# Patient Record
Sex: FEMALE | Race: WHITE | NOT HISPANIC OR LATINO | Employment: OTHER | URBAN - NONMETROPOLITAN AREA
[De-identification: names, ages, dates, MRNs, and addresses within clinical notes are randomized per-mention and may not be internally consistent; named-entity substitution may affect disease eponyms.]

---

## 2024-03-12 ENCOUNTER — APPOINTMENT (EMERGENCY)
Dept: RADIOLOGY | Facility: HOSPITAL | Age: 84
DRG: 480 | End: 2024-03-12
Payer: COMMERCIAL

## 2024-03-12 ENCOUNTER — HOSPITAL ENCOUNTER (INPATIENT)
Facility: HOSPITAL | Age: 84
LOS: 2 days | Discharge: NURSING, REHAB OR LONG-TERM CARE | DRG: 480 | End: 2024-03-15
Attending: STUDENT IN AN ORGANIZED HEALTH CARE EDUCATION/TRAINING PROGRAM | Admitting: FAMILY MEDICINE
Payer: COMMERCIAL

## 2024-03-12 DIAGNOSIS — S72.002A CLOSED FRACTURE OF LEFT HIP, INITIAL ENCOUNTER (HCC): Primary | ICD-10-CM

## 2024-03-12 PROBLEM — D72.823 LEUKEMOID REACTION: Status: ACTIVE | Noted: 2024-03-12
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
## 2024-03-12 PROBLEM — S72.002D CLOSED FRACTURE OF LEFT HIP WITH ROUTINE HEALING: Status: ACTIVE | Noted: 2024-03-12

## 2024-03-12 LAB
ALBUMIN SERPL BCP-MCNC: 4 G/DL (ref 3.5–5)
ALP SERPL-CCNC: 71 U/L (ref 34–104)
ALT SERPL W P-5'-P-CCNC: 12 U/L (ref 7–52)
ANION GAP SERPL CALCULATED.3IONS-SCNC: 9 MMOL/L (ref 4–13)
APTT PPP: 23 SECONDS (ref 23–37)
AST SERPL W P-5'-P-CCNC: 17 U/L (ref 13–39)
ATRIAL RATE: 87 BPM
BASOPHILS # BLD AUTO: 0.04 THOUSANDS/ÂΜL (ref 0–0.1)
BASOPHILS NFR BLD AUTO: 0 % (ref 0–1)
BILIRUB DIRECT SERPL-MCNC: 0.14 MG/DL (ref 0–0.2)
BILIRUB SERPL-MCNC: 0.65 MG/DL (ref 0.2–1)
BILIRUB UR QL STRIP: ABNORMAL
BUN SERPL-MCNC: 17 MG/DL (ref 5–25)
CALCIUM SERPL-MCNC: 9.6 MG/DL (ref 8.4–10.2)
CHLORIDE SERPL-SCNC: 108 MMOL/L (ref 96–108)
CLARITY UR: CLEAR
CO2 SERPL-SCNC: 26 MMOL/L (ref 21–32)
COLOR UR: YELLOW
CREAT SERPL-MCNC: 0.73 MG/DL (ref 0.6–1.3)
EOSINOPHIL # BLD AUTO: 0.01 THOUSAND/ÂΜL (ref 0–0.61)
EOSINOPHIL NFR BLD AUTO: 0 % (ref 0–6)
ERYTHROCYTE [DISTWIDTH] IN BLOOD BY AUTOMATED COUNT: 12.9 % (ref 11.6–15.1)
GFR SERPL CREATININE-BSD FRML MDRD: 77 ML/MIN/1.73SQ M
GLUCOSE P FAST SERPL-MCNC: 136 MG/DL (ref 65–99)
GLUCOSE SERPL-MCNC: 136 MG/DL (ref 65–140)
GLUCOSE UR STRIP-MCNC: NEGATIVE MG/DL
HCT VFR BLD AUTO: 42.2 % (ref 34.8–46.1)
HGB BLD-MCNC: 13.1 G/DL (ref 11.5–15.4)
HGB UR QL STRIP.AUTO: ABNORMAL
IMM GRANULOCYTES # BLD AUTO: 0.11 THOUSAND/UL (ref 0–0.2)
IMM GRANULOCYTES NFR BLD AUTO: 1 % (ref 0–2)
INR PPP: 1.01 (ref 0.84–1.19)
KETONES UR STRIP-MCNC: ABNORMAL MG/DL
LEUKOCYTE ESTERASE UR QL STRIP: NEGATIVE
LYMPHOCYTES # BLD AUTO: 1.38 THOUSANDS/ÂΜL (ref 0.6–4.47)
LYMPHOCYTES NFR BLD AUTO: 9 % (ref 14–44)
MAGNESIUM SERPL-MCNC: 1.8 MG/DL (ref 1.9–2.7)
MCH RBC QN AUTO: 30.1 PG (ref 26.8–34.3)
MCHC RBC AUTO-ENTMCNC: 31 G/DL (ref 31.4–37.4)
MCV RBC AUTO: 97 FL (ref 82–98)
MONOCYTES # BLD AUTO: 0.74 THOUSAND/ÂΜL (ref 0.17–1.22)
MONOCYTES NFR BLD AUTO: 5 % (ref 4–12)
NEUTROPHILS # BLD AUTO: 12.38 THOUSANDS/ÂΜL (ref 1.85–7.62)
NEUTS SEG NFR BLD AUTO: 85 % (ref 43–75)
NITRITE UR QL STRIP: NEGATIVE
NRBC BLD AUTO-RTO: 0 /100 WBCS
P AXIS: 59 DEGREES
PH UR STRIP.AUTO: 7 [PH]
PLATELET # BLD AUTO: 302 THOUSANDS/UL (ref 149–390)
PMV BLD AUTO: 9.4 FL (ref 8.9–12.7)
POTASSIUM SERPL-SCNC: 4.1 MMOL/L (ref 3.5–5.3)
PR INTERVAL: 162 MS
PROT SERPL-MCNC: 6.7 G/DL (ref 6.4–8.4)
PROT UR STRIP-MCNC: NEGATIVE MG/DL
PROTHROMBIN TIME: 13.6 SECONDS (ref 11.6–14.5)
QRS AXIS: 23 DEGREES
QRSD INTERVAL: 82 MS
QT INTERVAL: 358 MS
QTC INTERVAL: 430 MS
RBC # BLD AUTO: 4.35 MILLION/UL (ref 3.81–5.12)
SODIUM SERPL-SCNC: 143 MMOL/L (ref 135–147)
SP GR UR STRIP.AUTO: 1.02 (ref 1–1.03)
T WAVE AXIS: 52 DEGREES
UROBILINOGEN UR QL STRIP.AUTO: 0.2 E.U./DL
VENTRICULAR RATE: 87 BPM
WBC # BLD AUTO: 14.66 THOUSAND/UL (ref 4.31–10.16)

## 2024-03-12 PROCEDURE — 99285 EMERGENCY DEPT VISIT HI MDM: CPT | Performed by: PHYSICIAN ASSISTANT

## 2024-03-12 PROCEDURE — 80076 HEPATIC FUNCTION PANEL: CPT | Performed by: FAMILY MEDICINE

## 2024-03-12 PROCEDURE — 93005 ELECTROCARDIOGRAM TRACING: CPT

## 2024-03-12 PROCEDURE — 81001 URINALYSIS AUTO W/SCOPE: CPT | Performed by: FAMILY MEDICINE

## 2024-03-12 PROCEDURE — 93010 ELECTROCARDIOGRAM REPORT: CPT | Performed by: INTERNAL MEDICINE

## 2024-03-12 PROCEDURE — 85025 COMPLETE CBC W/AUTO DIFF WBC: CPT | Performed by: PHYSICIAN ASSISTANT

## 2024-03-12 PROCEDURE — 85730 THROMBOPLASTIN TIME PARTIAL: CPT | Performed by: PHYSICIAN ASSISTANT

## 2024-03-12 PROCEDURE — 73564 X-RAY EXAM KNEE 4 OR MORE: CPT

## 2024-03-12 PROCEDURE — 99284 EMERGENCY DEPT VISIT MOD MDM: CPT

## 2024-03-12 PROCEDURE — 36415 COLL VENOUS BLD VENIPUNCTURE: CPT | Performed by: PHYSICIAN ASSISTANT

## 2024-03-12 PROCEDURE — 99222 1ST HOSP IP/OBS MODERATE 55: CPT | Performed by: FAMILY MEDICINE

## 2024-03-12 PROCEDURE — 73502 X-RAY EXAM HIP UNI 2-3 VIEWS: CPT

## 2024-03-12 PROCEDURE — 80048 BASIC METABOLIC PNL TOTAL CA: CPT | Performed by: PHYSICIAN ASSISTANT

## 2024-03-12 PROCEDURE — 83735 ASSAY OF MAGNESIUM: CPT | Performed by: FAMILY MEDICINE

## 2024-03-12 PROCEDURE — 85610 PROTHROMBIN TIME: CPT | Performed by: PHYSICIAN ASSISTANT

## 2024-03-12 RX ORDER — DOCUSATE SODIUM 100 MG/1
100 CAPSULE, LIQUID FILLED ORAL 2 TIMES DAILY
Status: DISCONTINUED | OUTPATIENT
Start: 2024-03-12 | End: 2024-03-15 | Stop reason: HOSPADM

## 2024-03-12 RX ORDER — CHLORHEXIDINE GLUCONATE ORAL RINSE 1.2 MG/ML
15 SOLUTION DENTAL ONCE
Status: COMPLETED | OUTPATIENT
Start: 2024-03-12 | End: 2024-03-12

## 2024-03-12 RX ORDER — ONDANSETRON 2 MG/ML
4 INJECTION INTRAMUSCULAR; INTRAVENOUS EVERY 6 HOURS PRN
Status: DISCONTINUED | OUTPATIENT
Start: 2024-03-12 | End: 2024-03-13

## 2024-03-12 RX ORDER — ACETAMINOPHEN 325 MG/1
650 TABLET ORAL ONCE
Status: COMPLETED | OUTPATIENT
Start: 2024-03-12 | End: 2024-03-12

## 2024-03-12 RX ORDER — ACETAMINOPHEN 160 MG/5ML
650 SUSPENSION ORAL EVERY 4 HOURS PRN
Status: DISCONTINUED | OUTPATIENT
Start: 2024-03-12 | End: 2024-03-13

## 2024-03-12 RX ORDER — ENOXAPARIN SODIUM 100 MG/ML
40 INJECTION SUBCUTANEOUS DAILY
Status: DISCONTINUED | OUTPATIENT
Start: 2024-03-13 | End: 2024-03-13

## 2024-03-12 RX ORDER — MAGNESIUM SULFATE HEPTAHYDRATE 40 MG/ML
2 INJECTION, SOLUTION INTRAVENOUS ONCE
Status: COMPLETED | OUTPATIENT
Start: 2024-03-12 | End: 2024-03-12

## 2024-03-12 RX ORDER — SODIUM CHLORIDE, SODIUM LACTATE, POTASSIUM CHLORIDE, CALCIUM CHLORIDE 600; 310; 30; 20 MG/100ML; MG/100ML; MG/100ML; MG/100ML
75 INJECTION, SOLUTION INTRAVENOUS CONTINUOUS
Status: DISCONTINUED | OUTPATIENT
Start: 2024-03-12 | End: 2024-03-13

## 2024-03-12 RX ORDER — TRANEXAMIC ACID 10 MG/ML
1000 INJECTION, SOLUTION INTRAVENOUS ONCE
Status: COMPLETED | OUTPATIENT
Start: 2024-03-12 | End: 2024-03-12

## 2024-03-12 RX ADMIN — CHLORHEXIDINE GLUCONATE 15 ML: 1.2 RINSE ORAL at 20:52

## 2024-03-12 RX ADMIN — TRANEXAMIC ACID 1000 MG: 10 INJECTION, SOLUTION INTRAVENOUS at 19:45

## 2024-03-12 RX ADMIN — DOCUSATE SODIUM 100 MG: 100 CAPSULE, LIQUID FILLED ORAL at 20:53

## 2024-03-12 RX ADMIN — SODIUM CHLORIDE, SODIUM LACTATE, POTASSIUM CHLORIDE, AND CALCIUM CHLORIDE 75 ML/HR: .6; .31; .03; .02 INJECTION, SOLUTION INTRAVENOUS at 20:41

## 2024-03-12 RX ADMIN — ACETAMINOPHEN 325MG 650 MG: 325 TABLET ORAL at 17:35

## 2024-03-12 RX ADMIN — MAGNESIUM SULFATE HEPTAHYDRATE 2 G: 40 INJECTION, SOLUTION INTRAVENOUS at 20:58

## 2024-03-12 NOTE — DISCHARGE INSTRUCTIONS
Weight Bearing Status:                                           Weight bearing as tolerated left lower extremity.     DVT prophylaxis:  Lovenox daily x 3 weeks     Pain:  Continue analgesics as directed    Dressing Instructions:   You may remove your surgical dressing in 3 days (72 hours).   After this has been removed, you may leave incision open to air or cover it with a light gauze dressing.     Showering:   After your surgical dressing has been removed, you may shower and allow water to run over your incision.   Do not submerge incision for anything length of time. (No tubs, pools, hot tubs, etc)    Appt Instructions:   If you do not have your appointment, please call the clinic at [phone] to f/u with Dr. Huber within 2 weeks of discharge.     Contact the office sooner if you experience any increased numbness/tingling in the extremities or severe pain that does not respond to pain medication.       
room air
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
## 2024-03-13 ENCOUNTER — ANESTHESIA (INPATIENT)
Dept: PERIOP | Facility: HOSPITAL | Age: 84
DRG: 480 | End: 2024-03-13
Payer: COMMERCIAL

## 2024-03-13 ENCOUNTER — APPOINTMENT (INPATIENT)
Dept: RADIOLOGY | Facility: HOSPITAL | Age: 84
DRG: 480 | End: 2024-03-13
Payer: COMMERCIAL

## 2024-03-13 ENCOUNTER — ANESTHESIA EVENT (INPATIENT)
Dept: PERIOP | Facility: HOSPITAL | Age: 84
DRG: 480 | End: 2024-03-13
Payer: COMMERCIAL

## 2024-03-13 PROBLEM — E66.9 OBESITY, CLASS I, BMI 30-34.9: Status: ACTIVE | Noted: 2024-03-13

## 2024-03-13 PROBLEM — D72.823 LEUKEMOID REACTION: Status: RESOLVED | Noted: 2024-03-12 | Resolved: 2024-03-13

## 2024-03-13 LAB
ANION GAP SERPL CALCULATED.3IONS-SCNC: 7 MMOL/L (ref 4–13)
BACTERIA UR QL AUTO: NORMAL /HPF
BASOPHILS # BLD AUTO: 0.04 THOUSANDS/ÂΜL (ref 0–0.1)
BASOPHILS NFR BLD AUTO: 0 % (ref 0–1)
BUN SERPL-MCNC: 14 MG/DL (ref 5–25)
CALCIUM SERPL-MCNC: 9.2 MG/DL (ref 8.4–10.2)
CHLORIDE SERPL-SCNC: 108 MMOL/L (ref 96–108)
CO2 SERPL-SCNC: 26 MMOL/L (ref 21–32)
CREAT SERPL-MCNC: 0.6 MG/DL (ref 0.6–1.3)
EOSINOPHIL # BLD AUTO: 0.01 THOUSAND/ÂΜL (ref 0–0.61)
EOSINOPHIL NFR BLD AUTO: 0 % (ref 0–6)
ERYTHROCYTE [DISTWIDTH] IN BLOOD BY AUTOMATED COUNT: 12.9 % (ref 11.6–15.1)
GFR SERPL CREATININE-BSD FRML MDRD: 85 ML/MIN/1.73SQ M
GLUCOSE P FAST SERPL-MCNC: 119 MG/DL (ref 65–99)
GLUCOSE SERPL-MCNC: 119 MG/DL (ref 65–140)
HCT VFR BLD AUTO: 36 % (ref 34.8–46.1)
HGB BLD-MCNC: 11.4 G/DL (ref 11.5–15.4)
IMM GRANULOCYTES # BLD AUTO: 0.05 THOUSAND/UL (ref 0–0.2)
IMM GRANULOCYTES NFR BLD AUTO: 1 % (ref 0–2)
LYMPHOCYTES # BLD AUTO: 1.52 THOUSANDS/ÂΜL (ref 0.6–4.47)
LYMPHOCYTES NFR BLD AUTO: 15 % (ref 14–44)
MAGNESIUM SERPL-MCNC: 2.2 MG/DL (ref 1.9–2.7)
MCH RBC QN AUTO: 30 PG (ref 26.8–34.3)
MCHC RBC AUTO-ENTMCNC: 31.7 G/DL (ref 31.4–37.4)
MCV RBC AUTO: 95 FL (ref 82–98)
MONOCYTES # BLD AUTO: 0.8 THOUSAND/ÂΜL (ref 0.17–1.22)
MONOCYTES NFR BLD AUTO: 8 % (ref 4–12)
NEUTROPHILS # BLD AUTO: 7.62 THOUSANDS/ÂΜL (ref 1.85–7.62)
NEUTS SEG NFR BLD AUTO: 76 % (ref 43–75)
NON-SQ EPI CELLS URNS QL MICRO: NORMAL /HPF
NRBC BLD AUTO-RTO: 0 /100 WBCS
PLATELET # BLD AUTO: 269 THOUSANDS/UL (ref 149–390)
PMV BLD AUTO: 9.4 FL (ref 8.9–12.7)
POTASSIUM SERPL-SCNC: 4.2 MMOL/L (ref 3.5–5.3)
RBC # BLD AUTO: 3.8 MILLION/UL (ref 3.81–5.12)
RBC #/AREA URNS AUTO: NORMAL /HPF
SODIUM SERPL-SCNC: 141 MMOL/L (ref 135–147)
WBC # BLD AUTO: 10.04 THOUSAND/UL (ref 4.31–10.16)
WBC #/AREA URNS AUTO: NORMAL /HPF

## 2024-03-13 PROCEDURE — 99223 1ST HOSP IP/OBS HIGH 75: CPT | Performed by: ORTHOPAEDIC SURGERY

## 2024-03-13 PROCEDURE — 83735 ASSAY OF MAGNESIUM: CPT | Performed by: FAMILY MEDICINE

## 2024-03-13 PROCEDURE — 85025 COMPLETE CBC W/AUTO DIFF WBC: CPT | Performed by: FAMILY MEDICINE

## 2024-03-13 PROCEDURE — C1769 GUIDE WIRE: HCPCS | Performed by: ORTHOPAEDIC SURGERY

## 2024-03-13 PROCEDURE — 27245 TREAT THIGH FRACTURE: CPT | Performed by: ORTHOPAEDIC SURGERY

## 2024-03-13 PROCEDURE — 27245 TREAT THIGH FRACTURE: CPT | Performed by: PHYSICIAN ASSISTANT

## 2024-03-13 PROCEDURE — C1713 ANCHOR/SCREW BN/BN,TIS/BN: HCPCS | Performed by: ORTHOPAEDIC SURGERY

## 2024-03-13 PROCEDURE — NC001 PR NO CHARGE: Performed by: ORTHOPAEDIC SURGERY

## 2024-03-13 PROCEDURE — 73502 X-RAY EXAM HIP UNI 2-3 VIEWS: CPT

## 2024-03-13 PROCEDURE — 99232 SBSQ HOSP IP/OBS MODERATE 35: CPT

## 2024-03-13 PROCEDURE — 0QS706Z REPOSITION LEFT UPPER FEMUR WITH INTRAMEDULLARY INTERNAL FIXATION DEVICE, OPEN APPROACH: ICD-10-PCS | Performed by: ORTHOPAEDIC SURGERY

## 2024-03-13 PROCEDURE — 80048 BASIC METABOLIC PNL TOTAL CA: CPT | Performed by: FAMILY MEDICINE

## 2024-03-13 DEVICE — 5.0MM TI LOCKING SCREW W/T25 STARDRIVE 38MM F/IM NAIL-STER: Type: IMPLANTABLE DEVICE | Status: FUNCTIONAL

## 2024-03-13 DEVICE — TFNA FENESTRATED SCREW 100MM - STERILE
Type: IMPLANTABLE DEVICE | Status: FUNCTIONAL
Brand: TFN-ADVANCE

## 2024-03-13 DEVICE — 12MM/130 DEG TI CANN TFNA 170MM - STERILE
Type: IMPLANTABLE DEVICE | Status: FUNCTIONAL
Brand: TFN-ADVANCE

## 2024-03-13 RX ORDER — PROPOFOL 10 MG/ML
INJECTION, EMULSION INTRAVENOUS AS NEEDED
Status: DISCONTINUED | OUTPATIENT
Start: 2024-03-13 | End: 2024-03-13

## 2024-03-13 RX ORDER — FENTANYL CITRATE/PF 50 MCG/ML
25 SYRINGE (ML) INJECTION
Status: DISCONTINUED | OUTPATIENT
Start: 2024-03-13 | End: 2024-03-13

## 2024-03-13 RX ORDER — OXYCODONE HYDROCHLORIDE 5 MG/1
2.5 TABLET ORAL EVERY 6 HOURS PRN
Status: DISCONTINUED | OUTPATIENT
Start: 2024-03-13 | End: 2024-03-13

## 2024-03-13 RX ORDER — MAGNESIUM HYDROXIDE 1200 MG/15ML
LIQUID ORAL AS NEEDED
Status: DISCONTINUED | OUTPATIENT
Start: 2024-03-13 | End: 2024-03-13 | Stop reason: HOSPADM

## 2024-03-13 RX ORDER — LIDOCAINE HYDROCHLORIDE 10 MG/ML
INJECTION, SOLUTION EPIDURAL; INFILTRATION; INTRACAUDAL; PERINEURAL AS NEEDED
Status: DISCONTINUED | OUTPATIENT
Start: 2024-03-13 | End: 2024-03-13

## 2024-03-13 RX ORDER — DEXAMETHASONE SODIUM PHOSPHATE 10 MG/ML
INJECTION, SOLUTION INTRAMUSCULAR; INTRAVENOUS AS NEEDED
Status: DISCONTINUED | OUTPATIENT
Start: 2024-03-13 | End: 2024-03-13

## 2024-03-13 RX ORDER — ONDANSETRON 2 MG/ML
4 INJECTION INTRAMUSCULAR; INTRAVENOUS ONCE AS NEEDED
Status: DISCONTINUED | OUTPATIENT
Start: 2024-03-13 | End: 2024-03-13

## 2024-03-13 RX ORDER — FENTANYL CITRATE 50 UG/ML
INJECTION, SOLUTION INTRAMUSCULAR; INTRAVENOUS AS NEEDED
Status: DISCONTINUED | OUTPATIENT
Start: 2024-03-13 | End: 2024-03-13

## 2024-03-13 RX ORDER — BUPIVACAINE HYDROCHLORIDE 5 MG/ML
INJECTION, SOLUTION EPIDURAL; INTRACAUDAL AS NEEDED
Status: DISCONTINUED | OUTPATIENT
Start: 2024-03-13 | End: 2024-03-13 | Stop reason: HOSPADM

## 2024-03-13 RX ORDER — HYDROMORPHONE HCL IN WATER/PF 6 MG/30 ML
0.2 PATIENT CONTROLLED ANALGESIA SYRINGE INTRAVENOUS
Status: DISCONTINUED | OUTPATIENT
Start: 2024-03-13 | End: 2024-03-13

## 2024-03-13 RX ORDER — ROCURONIUM BROMIDE 10 MG/ML
INJECTION, SOLUTION INTRAVENOUS AS NEEDED
Status: DISCONTINUED | OUTPATIENT
Start: 2024-03-13 | End: 2024-03-13

## 2024-03-13 RX ORDER — ONDANSETRON 2 MG/ML
INJECTION INTRAMUSCULAR; INTRAVENOUS AS NEEDED
Status: DISCONTINUED | OUTPATIENT
Start: 2024-03-13 | End: 2024-03-13

## 2024-03-13 RX ORDER — ONDANSETRON 2 MG/ML
4 INJECTION INTRAMUSCULAR; INTRAVENOUS EVERY 6 HOURS PRN
Status: DISCONTINUED | OUTPATIENT
Start: 2024-03-13 | End: 2024-03-15 | Stop reason: HOSPADM

## 2024-03-13 RX ORDER — SODIUM CHLORIDE, SODIUM LACTATE, POTASSIUM CHLORIDE, CALCIUM CHLORIDE 600; 310; 30; 20 MG/100ML; MG/100ML; MG/100ML; MG/100ML
75 INJECTION, SOLUTION INTRAVENOUS CONTINUOUS
Status: DISCONTINUED | OUTPATIENT
Start: 2024-03-13 | End: 2024-03-14

## 2024-03-13 RX ORDER — OXYCODONE HYDROCHLORIDE 10 MG/1
10 TABLET ORAL EVERY 6 HOURS PRN
Status: DISCONTINUED | OUTPATIENT
Start: 2024-03-13 | End: 2024-03-15 | Stop reason: HOSPADM

## 2024-03-13 RX ORDER — ENOXAPARIN SODIUM 100 MG/ML
40 INJECTION SUBCUTANEOUS DAILY
Status: DISCONTINUED | OUTPATIENT
Start: 2024-03-14 | End: 2024-03-15 | Stop reason: HOSPADM

## 2024-03-13 RX ORDER — OXYCODONE HYDROCHLORIDE 5 MG/1
5 TABLET ORAL EVERY 6 HOURS PRN
Status: DISCONTINUED | OUTPATIENT
Start: 2024-03-13 | End: 2024-03-15 | Stop reason: HOSPADM

## 2024-03-13 RX ORDER — CEFAZOLIN SODIUM 2 G/50ML
2000 SOLUTION INTRAVENOUS ONCE
Status: COMPLETED | OUTPATIENT
Start: 2024-03-13 | End: 2024-03-13

## 2024-03-13 RX ORDER — TRANEXAMIC ACID 10 MG/ML
1000 INJECTION, SOLUTION INTRAVENOUS ONCE
Status: COMPLETED | OUTPATIENT
Start: 2024-03-13 | End: 2024-03-13

## 2024-03-13 RX ORDER — OXYCODONE HYDROCHLORIDE 5 MG/1
5 TABLET ORAL EVERY 6 HOURS PRN
Status: DISCONTINUED | OUTPATIENT
Start: 2024-03-13 | End: 2024-03-13

## 2024-03-13 RX ORDER — CEFAZOLIN SODIUM 2 G/50ML
2000 SOLUTION INTRAVENOUS EVERY 8 HOURS
Status: COMPLETED | OUTPATIENT
Start: 2024-03-14 | End: 2024-03-14

## 2024-03-13 RX ADMIN — SODIUM CHLORIDE, SODIUM LACTATE, POTASSIUM CHLORIDE, AND CALCIUM CHLORIDE 75 ML/HR: .6; .31; .03; .02 INJECTION, SOLUTION INTRAVENOUS at 20:59

## 2024-03-13 RX ADMIN — ROCURONIUM BROMIDE 50 MG: 10 INJECTION, SOLUTION INTRAVENOUS at 18:34

## 2024-03-13 RX ADMIN — FENTANYL CITRATE 100 MCG: 50 INJECTION INTRAMUSCULAR; INTRAVENOUS at 18:34

## 2024-03-13 RX ADMIN — ROCURONIUM BROMIDE 10 MG: 10 INJECTION, SOLUTION INTRAVENOUS at 19:26

## 2024-03-13 RX ADMIN — LIDOCAINE HYDROCHLORIDE 50 MG: 10 INJECTION, SOLUTION EPIDURAL; INFILTRATION; INTRACAUDAL; PERINEURAL at 18:34

## 2024-03-13 RX ADMIN — ACETAMINOPHEN 650 MG: 650 SUSPENSION ORAL at 04:57

## 2024-03-13 RX ADMIN — DEXAMETHASONE SODIUM PHOSPHATE 10 MG: 10 INJECTION, SOLUTION INTRAMUSCULAR; INTRAVENOUS at 18:35

## 2024-03-13 RX ADMIN — TRANEXAMIC ACID 1000 MG: 10 INJECTION, SOLUTION INTRAVENOUS at 18:40

## 2024-03-13 RX ADMIN — CEFAZOLIN SODIUM 2000 MG: 2 SOLUTION INTRAVENOUS at 18:54

## 2024-03-13 RX ADMIN — ENOXAPARIN SODIUM 40 MG: 40 INJECTION SUBCUTANEOUS at 08:21

## 2024-03-13 RX ADMIN — ONDANSETRON 4 MG: 2 INJECTION INTRAMUSCULAR; INTRAVENOUS at 19:49

## 2024-03-13 RX ADMIN — SODIUM CHLORIDE, SODIUM LACTATE, POTASSIUM CHLORIDE, AND CALCIUM CHLORIDE 75 ML/HR: .6; .31; .03; .02 INJECTION, SOLUTION INTRAVENOUS at 08:25

## 2024-03-13 RX ADMIN — DOCUSATE SODIUM 100 MG: 100 CAPSULE, LIQUID FILLED ORAL at 08:21

## 2024-03-13 RX ADMIN — SUGAMMADEX 200 MG: 100 INJECTION, SOLUTION INTRAVENOUS at 20:05

## 2024-03-13 RX ADMIN — PROPOFOL 100 MG: 10 INJECTION, EMULSION INTRAVENOUS at 18:34

## 2024-03-13 NOTE — ASSESSMENT & PLAN NOTE
- As above  - No history of syncope or seizure  - Per history, appears purely mechanical  - Check orthostatics  - Check EKG  - Check UA  - Will require PT/OT evaluation-possible placement versus home health care

## 2024-03-13 NOTE — OCCUPATIONAL THERAPY NOTE
Occupational Therapy Cancel Note        Patient Name: Madalyn Dhaliwal  Today's Date: 3/13/2024       03/13/24 1521   Note Type   Note type Evaluation;Cancelled Session   Cancel Reasons Medical status       OT order received, chart review completed. Pt admitted to [facility] on 3/12 w/ Dx: Closed fracture of L hip with routine healing. Pt to receive surgery this evening. Will continue to follow pt and provide care as pt is appropriate and available/pending ortho recommendations postop.      Chet Arriaga MS, OTR/L

## 2024-03-13 NOTE — ASSESSMENT & PLAN NOTE
- Patient presents with isolated leukocytosis with neutrophilic infiltration  - In absence of concurrent SIRS criteria  - Doubt underlying infection, however, rule out  - Plain film chest x-ray unrevealing  - Check urinalysis
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
## 2024-03-13 NOTE — PHYSICAL THERAPY NOTE
PHYSICAL THERAPY NOTE      Patient Name: Madalyn Dhaliwal  Today's Date: 3/13/2024       03/13/24 1520   Note Type   Note type Cancelled Session;Evaluation   Cancel Reasons Medical status       Received order for PT consult. Chart reviewed. Pt admitted with diagnosis of intertrochanteric fracture of proximal L femur. At this time, PT session cancelled due to plan for surgical fixation this evening. Will follow and see patient as medically appropriate at a later time and await final orthopedic recommendations.       Viet Singh, PT

## 2024-03-13 NOTE — ANESTHESIA PREPROCEDURE EVALUATION
"Procedure:  INSERTION NAIL IM FEMUR ANTEGRADE (TROCHANTERIC) (Left: Leg Upper)    Relevant Problems   Orthopedic/Musculoskeletal   (+) Closed fracture of left hip with routine healing      Other   (+) Fall from standing   (+) Obesity, Class I, BMI 30-34.9        Physical Exam    Airway    Mallampati score: II  TM Distance: >3 FB  Neck ROM: full     Dental        Cardiovascular      Pulmonary      Other Findings  Mild deliriumpost-pubertal.    Anesthesia Plan  ASA Score- 2     Anesthesia Type- general with ASA Monitors.         Additional Monitors:     Airway Plan: ETT.           Plan Factors-    Chart reviewed. EKG reviewed. Imaging results reviewed. Existing labs reviewed. Patient summary reviewed.    Patient is not a current smoker.  Patient did not smoke on day of surgery.            Induction- intravenous.    Postoperative Plan- Plan for postoperative opioid use. Planned trial extubation    Informed Consent- Anesthetic plan and risks discussed with patient.  I personally reviewed this patient with the CRNA. Discussed and agreed on the Anesthesia Plan with the CRNA..          VITALS  /63 (BP Location: Left arm)   Pulse 73   Temp 98.3 °F (36.8 °C) (Oral)   Resp 18   Ht 5' 5\" (1.651 m)   Wt 84.3 kg (185 lb 13.6 oz)   SpO2 94%   BMI 30.93 kg/m²   BP Readings from Last 3 Encounters:   03/13/24 147/63     LABS  Results from Last 12 Months   Lab Units 03/13/24 0454 03/1942   SODIUM mmol/L 141 143   POTASSIUM mmol/L 4.2 4.1   CHLORIDE mmol/L 108 108   CO2 mmol/L 26 26   ANION GAP mmol/L 7 9   BUN mg/dL 14 17   CREATININE mg/dL 0.60 0.73   CALCIUM mg/dL 9.2 9.6   GLUCOSE RANDOM mg/dL 119 136   MAGNESIUM mg/dL 2.2 1.8*   AST U/L  --  17   ALT U/L  --  12   ALK PHOS U/L  --  71   TOTAL BILIRUBIN mg/dL  --  0.65   ALBUMIN g/dL  --  4.0     Results from Last 12 Months   Lab Units 03/13/24 0454 03/12/24  1941   WBC Thousand/uL 10.04 14.66*   HEMOGLOBIN g/dL 11.4* 13.1   HEMATOCRIT % 36.0 42.2   PLATELETS " Thousands/uL 269 302     Results from Last 12 Months   Lab Units 03/12/24  1941   INR  1.01   PTT seconds 23       ECG  Encounter Date: 03/12/24   ECG 12 lead   Result Value    Ventricular Rate 87    Atrial Rate 87    VA Interval 162    QRSD Interval 82    QT Interval 358    QTC Interval 430    P Axis 59    QRS Axis 23    T Wave Axis 52    Narrative    Normal sinus rhythm  Cannot rule out Inferior infarct , age undetermined  Anterior infarct , age undetermined  Abnormal ECG  No previous ECGs available  Confirmed by Kasia Ellington ([id number]) on 3/12/2024 10:16:39 PM     No results found for this or any previous visit.    ECHOCARDIOGRAPHY AND OTHER TESTING/IMAGING  TTE POCUS: normal    ANESTHESIA RISK-BENEFIT DISCUSSION  BENEFITS INCLUDE (NBK [id number], PMID [id number]):   (1) A specialized anesthesia team reduces mortality and morbidity for major surgeries.  (2) The team provides analgesia/sedation/amnesia/akinesia as safely as possible.  (3) The team strives to reduce discomfort as much as reasonably possible.    RISKS ASSESSMENT (AND PLANS TO MITIGATE RISKS) INCLUDE:      Neurologic system: IntraOp awareness (Risk is ~1:1,000 - 1:14,000; PMID 86306932), Stroke (Risk ~<0.1-2% for most cases; PMID 98629715), nerve injury, vision loss, and POCD.  Since regional anesthesia may be used, risks of block failure, bleeding, infection, and nerve injury were discussed.  Airway and Pulmonary system: Dental or mouth injury, throat pain, critical hypoxia, pneumothorax, prolonged intubation, post-op respiratory compromise.  Airway/Intubation risks and prior data: No prior advanced airway notes in [facility] EMR  Major ARISCAT risk factors for pulmonary complications include: Age >80: 1 pt and Case surgical time estimated at >2hrs: 1pt, yielding a score 2-3= Intermediate risk, 13.3%.  Cardiovascular system: Hypotension/Vasoplegia, arrhythmias, blood clot, allison-op cardiac injury/MACE, bleeding, infection, or injury to vascular  structures.  Signs of active, severe cardiac instability: none  Milton's RCRI score items: none, yielding an RCRI Score of 0= 0.4% risk of MACE  Are allison-op or intra-op beta blockers indicated? (PMID [id number]): no  FEN/GI system: Aspiration risk (~0.5% [facility] [id number]) and PONV (10-80% per Apfel score).  ASA NPO guideline compliance?: Yes  Medication risk assessment: Allergic reactions, bleeding due to anticoagulant use, overdoses, drug-drug interactions, injury to a fetus or  in pregnant or breastfeeding patients, sedation while driving/operating heavy machinery.  Recent relevant medications: See MAR or Med Review  Personal or family history of anesthesia complications: no  Pregnancy Status: N/A  Estimate mortality risks associated with anesthesia based on ASA-PS (PMID [id number]): ASA-PS II: risk 1:20,000

## 2024-03-13 NOTE — H&P
[facility]  H&P  Name: Madalyn Dhaliwal 83 y.o. female I MRN: [id number]  Unit/Bed#: [facility] 308-01 I Date of Admission: 3/12/2024   Date of Service: 3/12/2024 I Hospital Day: 0      Assessment/Plan   Leukemoid reaction  Assessment & Plan  - Patient presents with isolated leukocytosis with neutrophilic infiltration  - In absence of concurrent SIRS criteria  - Doubt underlying infection, however, rule out  - Plain film chest x-ray unrevealing  - Check urinalysis    Fall from standing  Assessment & Plan  - As above  - No history of syncope or seizure  - Per history, appears purely mechanical  - Check orthostatics  - Check EKG  - Check UA  - Will require PT/OT evaluation-possible placement versus home health care    Closed fracture of left hip with routine healing  Assessment & Plan  - Patient presents this evening after sustaining mechanical fall from standing  - Patient ambulating when hit with high morris of wind making her lose her balance  - Patient fell upon left hip, denies striking head  - Patient with pain and inability to ambulate after the event  - In ED, underwent routine plain film imaging which revealed left femoral neck fracture, nondisplaced, noncomminuted  - Case discussed between ED provider and orthopedic surgery who recommended admission for further evaluation and management  - Patient without complaints of loss of consciousness, lightheadedness, dizziness, shaking behavior, urinary or bladder incontinence  - Index of suspicion for purely mechanical fall from standing with subsequent left femoral neck fracture    Plan:  - Admit to medicine  - Orthopedic surgery consultation (Case already discussed between ED provider and orthopedic team)  - N.p.o. after midnight  - Analgesics/anti-inflammatories overnight  - Judicious maintenance fluids for time being as n.p.o. (no self-reported past medical history of congestive heart failure)  - Routine metabolic profile without significant  "derangement  - Check preoperative EKG  - Patient without self-reported past medical history  - Check UA    Based upon patient history and available objective data:  - Richter perioperative risk stratification:  - 30-day cardiac and/or stroke risk-0.24%  - 30-day cardiac risk-0.15%  - 30-day stroke risk-0.08%  - 30-day mortality risk 0.45%    Geriatric sensitive perioperative cardiac risk index:  - Probability of perioperative myocardial infarction or cardiac arrest-0.2%       VTE Prophylaxis: Enoxaparin (Lovenox)  / sequential compression device   Code Status: Full  POLST: There is no POLST form on file for this patient (pre-hospital)    Anticipated Length of Stay:  Patient will be admitted on an Observation basis with an anticipated length of stay of  < 2 midnights.   Justification for Hospital Stay: Hip fracture     Total Time for Visit, including Counseling / Coordination of Care: 45 minutes.  Greater than 50% of this total time spent on direct patient counseling and coordination of care.    Chief Complaint:   Fall/L hip pain    History of Present Illness:    Madalyn Dhaliwal is a 83 y.o. female with no self-reported past medical history who presents this evening after sustaining a fall.    The patient apparently was ambulating out of a store when she experienced a strong morris of wind.  Patient states that the wind \"knocked her over\" onto her left side, striking her hip upon the ground.  Patient denies head strike or other trauma.  Patient reports unable to ambulate/stand after fall.      Patient denies lightheadedness, dizziness, loss of consciousness, bladder or bowel incontinence, shaking behavior.  Denies history of seizure or syncope.    In ED, underwent plain film imaging of the hip which was suggestive of left femoral neck fracture.  Case discussed between ED provider and orthopedic surgery with recommendation for admission.    Patient will be admitted to the hospitalist service for further evaluation " "management of left hip fracture.  Review of Systems:    Review of Systems   Constitutional: Negative.    HENT: Negative.     Eyes: Negative.    Respiratory: Negative.     Cardiovascular: Negative.    Gastrointestinal: Negative.    Endocrine: Negative.    Genitourinary: Negative.    Musculoskeletal:  Positive for myalgias.   Skin: Negative.    Allergic/Immunologic: Negative.    Neurological: Negative.    Hematological: Negative.    Psychiatric/Behavioral: Negative.         Past Medical and Surgical History:     History reviewed. No pertinent past medical history.    Past Surgical History:   Procedure Laterality Date    MRI BREAST BIOPSY LEFT (ALL INCLUSIVE) Left 4/16/2014       Meds/Allergies:    Prior to Admission medications    Not on File     I have reviewed home medications with patient personally.    Allergies: No Known Allergies    Social History:     Marital Status: /Civil Union      Social History     Substance and Sexual Activity   Alcohol Use Never     Social History     Tobacco Use   Smoking Status Never   Smokeless Tobacco Never     Social History     Substance and Sexual Activity   Drug Use Never       Family History:    non-contributory    Physical Exam:     Vitals:   Blood Pressure: 145/81 (03/12/24 2037)  Pulse: 89 (03/12/24 2037)  Temperature: 98.8 °F (37.1 °C) (03/12/24 2037)  Temp Source: Temporal (03/12/24 2037)  Respirations: 20 (03/12/24 2037)  Height: 5' 5\" (165.1 cm) (03/12/24 2044)  Weight - Scale: 84.1 kg (185 lb 6.5 oz) (03/12/24 2044)  SpO2: 94 % (03/12/24 2037)    Physical Exam  Constitutional:       General: She is not in acute distress.     Appearance: Normal appearance. She is not ill-appearing, toxic-appearing or diaphoretic.   HENT:      Head: Normocephalic and atraumatic.      Right Ear: External ear normal.      Left Ear: External ear normal.      Nose: Nose normal.      Mouth/Throat:      Pharynx: Oropharynx is clear.   Eyes:      Conjunctiva/sclera: Conjunctivae normal. "   Cardiovascular:      Rate and Rhythm: Normal rate and regular rhythm.      Pulses: Normal pulses.      Heart sounds: No murmur heard.  Pulmonary:      Effort: Pulmonary effort is normal. No respiratory distress.      Breath sounds: No wheezing or rhonchi.   Abdominal:      General: Abdomen is flat. Bowel sounds are normal.      Tenderness: There is no abdominal tenderness.   Musculoskeletal:         General: Swelling, tenderness, deformity and signs of injury present.      Cervical back: Normal range of motion.   Skin:     General: Skin is warm and dry.      Capillary Refill: Capillary refill takes less than 2 seconds.   Neurological:      General: No focal deficit present.      Mental Status: She is alert. Mental status is at baseline.   Psychiatric:         Mood and Affect: Mood normal.         Thought Content: Thought content normal.         Judgment: Judgment normal.         Additional Data:     Lab Results: I have personally reviewed pertinent reports.      Results from last 7 days   Lab Units 03/12/24  1941   WBC Thousand/uL 14.66*   HEMOGLOBIN g/dL 13.1   HEMATOCRIT % 42.2   PLATELETS Thousands/uL 302   NEUTROS PCT % 85*   LYMPHS PCT % 9*   MONOS PCT % 5   EOS PCT % 0     Results from last 7 days   Lab Units 03/12/24  1942   POTASSIUM mmol/L 4.1   CHLORIDE mmol/L 108   CO2 mmol/L 26   BUN mg/dL 17   CREATININE mg/dL 0.73   CALCIUM mg/dL 9.6   ALK PHOS U/L 71   ALT U/L 12   AST U/L 17     Results from last 7 days   Lab Units 03/12/24  1941   INR  1.01       Imaging: I have personally reviewed pertinent reports.      No results found.    EKG, Pathology, and Other Studies Reviewed on Admission:   EKG:     [facility] / Care Everywhere Records Reviewed: Yes     ** Please Note: This note has been constructed using a voice recognition system. **

## 2024-03-13 NOTE — UTILIZATION REVIEW
"Initial Clinical Review    WAS OBSERVATION 3/12/24 @ 1929 CONVERTED TO INPATIENT ADMISSION 3/13/24 @ 0929 DUE TO CONTINUED STAY REQUIRED TO CARE FOR PATIENT WITH DX: CLOSED FRACTURE OF LEFT HIP.      Admission: Date/Time/Statement:   Admission Orders (From admission, onward)       Ordered        03/13/24 0929  Inpatient Admission  Once            03/12/24 1929  Place in Observation  Once                          Orders Placed This Encounter   Procedures    Inpatient Admission     Standing Status:   Standing     Number of Occurrences:   1     Order Specific Question:   Level of Care     Answer:   Med Surg [16]     Order Specific Question:   Estimated length of stay     Answer:   More than 2 Midnights     Order Specific Question:   Certification     Answer:   I certify that inpatient services are medically necessary for this patient for a duration of greater than two midnights. See H&P and MD Progress Notes for additional information about the patient's course of treatment.     ED Arrival Information       Expected   -    Arrival   3/12/2024 16:54    Acuity   Less Urgent              Means of arrival   Ambulance    Escorted by   [facility]    Service   Hospitalist    Admission type   Emergency              Arrival complaint   fall/knee pain             Chief Complaint   Patient presents with    Fall     Patient reports walking out of grocery store down ramp when a strong wind knocked her down. Reporting left knee pain. Denies head strike, no LOC.        Initial Presentation: 83 y.o. female to ED via EMS from home   Present to ED after sustaining a fall. The patient apparently was ambulating out of a store when she experienced a strong morris of wind. Patient states that the wind \"knocked her over\" onto her left side, striking her hip upon the ground. Patient denies head strike or other trauma. Patient reports unable to ambulate/stand after fall.   PMHX: None  Admitted to OBS with DX: Closed fracture of left hip with " routine healing   on exam: Swelling, tenderness, deformity  of left hip. leukocytosis   XR = left femoral neck fracture.   PLAN: cont ivf; pain / nausea control; monitor labs; consult ortho; f/u UA; f/u orthostatics; PT/ OT eval - tx; NPO after MN      Date: 3/13/24 - CHANGED TO INPATIENT  Plan for surgical fixation 3/13. Will need DVT prophylaxis for 3 weeks post op ; EKG and UA normal;  Patient states that pain is well controlled and comes and goes.   Plan: cont ivf; pain / nausea control; monitor labs; f/u PT/ OT post-op; NPO     ORTHO CONSULT  Assessment: Intertrochanteric fracture proximal left femur; ambulatory dysfunction  The left lower extremity is abducted, shortened and externally rotated. She does have some tenderness to palpation of the proximal left thigh and pain with any attempt at left hip range of motion. She denies any tenderness to palpation throughout the remainder of the femur, the knee, the lower leg or the ankle and foot. The right lower extremity exam and bilateral upper extremity examinations are grossly benign.   Plan: surgical fixation of her left hip later today.     OP REPORT  SURGERY DATE: 3/13/2024   Procedure(s): Left - INSERTION NAIL IM FEMUR ANTEGRADE (TROCHANTERIC)  Anesthesia Type: General with supplemental local utilizing 30 cc of 0.5% Marcaine without epinephrine  Operative Findings: At the time of the procedure, the patient's left intertrochanteric hip fracture was stably fixated utilizing the [employer] TFNA system with a 12 mm diameter 130 degree neck angle nail with 100 mm compression screw and 36 mm distal locking screw.  Fluoroscopic images in AP and lateral planes demonstrated excellent fracture alignment and stable fixation.         Day 3:  3/14/24  Has surpassed a 2nd midnight with active treatments and services  POD # 1 s/p left femur short TFN   Patient states that she is feeling well.  States that she has some intermittent leg pain additional inpatient hospital stay  due to requiring monitoring of cbc  - Heme 9.9 (preop 11.4). PT/OT evaluation - recommend rehab   Plan: start iv abx; ont ivf; pain / nausea control; monitor labs; f/u PT/ OT recom; monitor labs    ED Triage Vitals   Temperature Pulse Respirations Blood Pressure SpO2   03/12/24 1656 03/12/24 1656 03/12/24 1656 03/12/24 1656 03/12/24 1656   (!) 97.3 °F (36.3 °C) 84 18 128/80 95 %      Temp Source Heart Rate Source Patient Position - Orthostatic VS BP Location FiO2 (%)   03/12/24 1656 03/12/24 1656 03/12/24 1656 03/12/24 1656 --   Temporal Monitor Lying Left arm       Pain Score       03/12/24 1735       3          Wt Readings from Last 1 Encounters:   03/14/24 84.6 kg (186 lb 8.2 oz)     Additional Vital Signs:   Date/Time Temp Pulse Resp BP MAP (mmHg) SpO2 O2 Flow Rate (L/min) O2 Device Cardiac (WDL) Patient Position - Orthostatic VS   03/14/24 1116 99.4 °F (37.4 °C) 93 18 87/59 Abnormal  69 96 % -- None (Room air) -- Sitting   03/14/24 0952 -- -- -- 105/59 -- -- -- -- -- Sitting   03/14/24 0950 -- -- -- 102/57 -- -- -- -- -- --    Patient Position - Orthostatic VS: Sitting in chair, after pivot from bed to chair at 03/14/24 0950   03/14/24 0936 -- -- -- 139/63 -- -- -- -- -- Sitting - Orthostatic VS   03/14/24 0932 -- -- -- 130/55 -- -- -- -- -- Lying - Orthostatic VS   03/14/24 0718 98.3 °F (36.8 °C) 94 18 154/70 101 93 % -- None (Room air) -- Lying   03/14/24 0300 98.4 °F (36.9 °C) 94 19 138/99 115 94 % -- None (Room air) -- Lying   03/14/24 0000 97.6 °F (36.4 °C) 99 20 146/64 92 94 % -- None (Room air) -- Lying   03/13/24 2300 98 °F (36.7 °C) 93 15 147/65 94 92 % -- None (Room air) -- Lying   03/13/24 2200 98.1 °F (36.7 °C) 95 23 Abnormal  134/65 93 95 % -- None (Room air) -- --   03/13/24 2100 -- 99 19 155/62 84 93 % -- None (Room air) -- --   03/13/24 2040 -- 97 17 119/57 -- 92 % -- None (Room air) WDL --   03/13/24 2039 -- 99 23 Abnormal  114/55 72 93 % -- -- -- --   03/13/24 2035 97.1 °F (36.2 °C) Abnormal   101 18 176/81 Abnormal  121 93 % -- None (Room air) WDL --   03/13/24 2030 -- 95 22 -- -- 98 % -- None (Room air) -- --   03/13/24 2020 97 °F (36.1 °C) Abnormal  96 22 146/84 -- 99 % 6 L/min Simple mask WDL --   03/13/24 1750 98.6 °F (37 °C) 85 18 162/75 -- 94 % -- None (Room air) -- --   03/13/24 1600 98.2 °F (36.8 °C) 86 17 164/76 109 95 % -- None (Room air) -- Lying     Date/Time Temp Pulse Resp BP MAP (mmHg) SpO2 O2 Device Patient Position - Orthostatic VS   03/13/24 0800 98.3 °F (36.8 °C) 73 18 147/63 91 94 % None (Room air) Lying   03/13/24 0410 98.3 °F (36.8 °C) 84 16 165/66 106 95 % None (Room air) Lying   03/12/24 2300 98.4 °F (36.9 °C) 86 20 140/64 92 95 % None (Room air) Lying   03/12/24 2037 98.8 °F (37.1 °C) 89 20 145/81 -- 94 % None (Room air) Lying   03/12/24 1656 97.3 °F (36.3 °C) Abnormal  84 18 128/80 -- 95 % None (Room air) Lying       EKG: Normal sinus rhythm  Cannot rule out Inferior infarct , age undetermined  Anterior infarct , age undetermined  Abnormal ECG  No previous ECGs available      Pertinent Labs/Diagnostic Test Results:   XR hip/pelv 2-3 vws left if performed   Final Result by Tony Rose MD (03/14 1119)      Fluoroscopy provided for procedure guidance.      Please refer to the separate procedure note for additional details.                  Workstation performed: [id number]         XR hip/pelv 2-3 vws left if performed   Final Result by Kathy Russell MD (03/13 1103)   Mildly comminuted left intertrochanteric fracture.            Workstation performed: [id number]         XR knee 4+ vw left injury   Final Result by Kathy Russell MD (03/13 1035)      No acute osseous abnormality.   Severe osteoarthritis.            Workstation performed: [id number]              Results from last 7 days   Lab Units 03/14/24  0549 03/13/24  0454 03/12/24  1941   WBC Thousand/uL 14.11* 10.04 14.66*   HEMOGLOBIN g/dL 9.9* 11.4* 13.1   HEMATOCRIT % 30.5* 36.0 42.2   PLATELETS Thousands/uL 279  269 302   NEUTROS ABS Thousands/µL  --  7.62 12.38*        Results from last 7 days   Lab Units 03/14/24  0549 03/13/24  0454 03/1942   SODIUM mmol/L 139 141 143   POTASSIUM mmol/L 4.0 4.2 4.1   CHLORIDE mmol/L 107 108 108   CO2 mmol/L 24 26 26   ANION GAP mmol/L 8 7 9   BUN mg/dL 14 14 17   CREATININE mg/dL 0.70 0.60 0.73   EGFR ml/min/1.73sq m 81 85 77   CALCIUM mg/dL 9.0 9.2 9.6   MAGNESIUM mg/dL  --  2.2 1.8*     Results from last 7 days   Lab Units 03/1942   AST U/L 17   ALT U/L 12   ALK PHOS U/L 71   TOTAL PROTEIN g/dL 6.7   ALBUMIN g/dL 4.0   TOTAL BILIRUBIN mg/dL 0.65   BILIRUBIN DIRECT mg/dL 0.14        Results from last 7 days   Lab Units 03/14/24  0549 03/13/24 0454 03/1942   GLUCOSE RANDOM mg/dL 156* 119 136        Results from last 7 days   Lab Units 03/12/24 1941   PROTIME seconds 13.6   INR  1.01   PTT seconds 23        Results from last 7 days   Lab Units 03/12/24  2150   CLARITY UA  Clear   COLOR UA  Yellow   SPEC GRAV UA  1.020   PH UA  7.0   GLUCOSE UA mg/dl Negative   KETONES UA mg/dl 40 (2+)*   BLOOD UA  Trace-Intact*   PROTEIN UA mg/dl Negative   NITRITE UA  Negative   BILIRUBIN UA  Small*   UROBILINOGEN UA E.U./dl 0.2   LEUKOCYTES UA  Negative   WBC UA /hpf 0-1   RBC UA /hpf 0-1   BACTERIA UA /hpf Occasional   EPITHELIAL CELLS WET PREP /hpf Occasional          ED Treatment:   Medication Administration from 03/12/2024 1654 to 03/12/2024 2036         Date/Time Order Dose Route Action     03/12/2024 1735 EDT acetaminophen (TYLENOL) tablet 650 mg 650 mg Oral Given     03/12/2024 1945 EDT tranexamic acid (CYKLOKAPRON) 1000-0.7 MG/100ML-% injection 1,000 mg 1,000 mg Intravenous New Bag          History reviewed. No pertinent past medical history.  Present on Admission:   Fall from standing   Leukemoid reaction   Obesity, Class I, BMI 30-34.9      Admitting Diagnosis: Knee pain [M25.569]  Closed fracture of left hip, initial encounter ([facility]) [S72.002A]    Age/Sex: 83 y.o.  female    Admission Orders: SCDs; I/O; Daily wts; non-wt bearing; I/S; NPO    Scheduled Medications:  docusate sodium, 100 mg, Oral, BID    magnesium sulfate 2 g/50 mL IVPB (premix) 2 g  Dose: 2 g  Freq: Once Route: IV  Last Dose: Stopped (03/12/24 2258)  Start: 03/12/24 2100 End: 03/12/24 2258     ceFAZolin (ANCEF) IVPB (premix in dextrose) 2,000 mg 50 mL  Dose: 2,000 mg  Freq: Every 8 hours Route: IV  Last Dose: Stopped (03/14/24 1013)  Start: 03/14/24 0000 End: 03/14/24 2359     Continuous IV Infusions:  lactated ringers, 75 mL/hr, Intravenous, Continuous      PRN Meds:  acetaminophen, 650 mg, Oral, Q4H PRN  (3/13 rec'd x1)   ondansetron, 4 mg, Intravenous, Q6H PRN  oxyCODONE, 2.5 mg, Oral, Q6H PRN  oxyCODONE, 5 mg, Oral, Q6H PRN        IP CONSULT TO ORTHOPEDIC SURGERY    Network Utilization Review Department  ATTENTION: Please call with any questions or concerns to [phone] and carefully listen to the prompts so that you are directed to the right person. All voicemails are confidential.   For Discharge needs, contact Care Management DC Support Team at [phone] opt. 2  Send all requests for admission clinical reviews, approved or denied determinations and any other requests to dedicated fax number below belonging to the campus where the patient is receiving treatment. List of dedicated fax numbers for the Facilities:  FACILITY NAME UR FAX NUMBER   ADMISSION DENIALS (Administrative/Medical Necessity) [phone]   DISCHARGE SUPPORT TEAM (NETWORK) [phone]   PARENT CHILD HEALTH (Maternity/NICU/Pediatrics) [phone]   [facility] [phone]   [facility] [phone]   [facility] [phone]   [facility] [phone]   [facility] [phone]   [facility] [phone]   General acute hospital [phone]   [facility]  Critical access hospital [phone]   [facility] [phone]   [facility] [phone]   [facility] [phone]   [facility] [phone]

## 2024-03-13 NOTE — PLAN OF CARE
Problem: PAIN - ADULT  Goal: Verbalizes/displays adequate comfort level or baseline comfort level  Description: Interventions:  - Encourage patient to monitor pain and request assistance  - Assess pain using appropriate pain scale  - Administer analgesics based on type and severity of pain and evaluate response  - Implement non-pharmacological measures as appropriate and evaluate response  - Consider cultural and social influences on pain and pain management  - Notify physician/advanced practitioner if interventions unsuccessful or patient reports new pain  Outcome: Progressing     Problem: INFECTION - ADULT  Goal: Absence or prevention of progression during hospitalization  Description: INTERVENTIONS:  - Assess and monitor for signs and symptoms of infection  - Monitor lab/diagnostic results  - Monitor all insertion sites, i.e. indwelling lines, tubes, and drains  - Monitor endotracheal if appropriate and nasal secretions for changes in amount and color  - [place] appropriate cooling/warming therapies per order  - Administer medications as ordered  - Instruct and encourage patient and family to use good hand hygiene technique  - Identify and instruct in appropriate isolation precautions for identified infection/condition  Outcome: Progressing     Problem: SAFETY ADULT  Goal: Patient will remain free of falls  Description: INTERVENTIONS:  - Educate patient/family on patient safety including physical limitations  - Instruct patient to call for assistance with activity   - Consult OT/PT to assist with strengthening/mobility   - Keep Call bell within reach  - Keep bed low and locked with side rails adjusted as appropriate  - Keep care items and personal belongings within reach  - Initiate and maintain comfort rounds  - Make Fall Risk Sign visible to staff  - Offer Toileting every 2 Hours, in advance of need if unable to notify staff  - Initiate/Maintain bed/chair alarm for confusion, impulsivity, or noncompliance  with notifying staff  - Apply yellow socks and bracelet for high fall risk patients  - Consider moving patient to room near nurses station  Outcome: Progressing  Goal: Maintain or return to baseline ADL function  Description: INTERVENTIONS:  -  Assess patient's ability to carry out ADLs; assess patient's baseline for ADL function and identify physical deficits which impact ability to perform ADLs (bathing, care of mouth/teeth, toileting, grooming, dressing, etc.)  - Assess/evaluate cause of self-care deficits   - Assess range of motion  - Assess patient's mobility; develop plan if impaired  - Assess patient's need for assistive devices and provide as appropriate  - Encourage maximum independence but intervene and supervise when necessary  - Involve family in performance of ADLs  - Assess for home care needs following discharge   - Consider OT consult to assist with ADL evaluation and planning for discharge  - Provide patient education as appropriate  Outcome: Progressing  Goal: Maintains/Returns to pre admission functional level  Description: INTERVENTIONS:  - Perform AM-PAC 6 Click Basic Mobility/ Daily Activity assessment daily.  - Set and communicate daily mobility goal to care team and patient/family/caregiver.   - Collaborate with rehabilitation services on mobility goals if consulted  - Perform Range of Motion 3 times a day.  - Reposition patient every 2 hours.  - Out of bed for toileting when medically appropriate  - Record patient progress and toleration of activity level   Outcome: Progressing

## 2024-03-13 NOTE — CONSULTS
Consultation - Orthopedics   Madalyn Dhaliwal 83 y.o. female MRN: [id number]  Unit/Bed#: -01 Encounter: [id number]      Assessment/Plan     Assessment:  Intertrochanteric fracture proximal left femur; ambulatory dysfunction  Plan:  The risks, benefits, options and alternatives of treatment were discussed, including but not limited to:  Infection, blood loss, DVT/PE, poor wound healing, anesthetic risks, loss of motion, nerve/blood vessel damage, poor fracture healing, fixation failure and malunion.  After a thorough discussion, the plan will be to proceed with surgical fixation of her left hip later today.  Postoperatively, she should be able to bear weight as tolerated.  She will be placed on DVT prophylaxis for approximately 3 weeks postoperatively.    History of Present Illness   Physician Requesting Consult: Dixon Zacarias MD  Reason for Consult / Principal Problem: Fracture left hip  HPI: Madalyn Dhaliwal is a 83 y.o. year old female who presents with injury to left hip.  She states she was leaving the grocery store when a morris of wind knocked her over falling toward her left side.  She denies any prodromal symptoms.  She denies head trauma.  She denies lightheadedness, chest pain or shortness of breath.  She denies any additional injuries.  She was seen in the emergency room at [facility] with evidence of left hip fracture.  She was admitted to the medical service and orthopedic consultation requested.  The patient is seen by myself.  She denies lower extremity paresthesias.    Inpatient consult to Orthopedic Surgery  Consult performed by: Prabhu Huber  Consult ordered by: Vitaly Saul DO          Review of Systems: Patient's 10 organ system review is negative excluding the chief complaint and as is consistent with the past medical history.    Historical Information   History reviewed. No pertinent past medical history.  Past Surgical History:   Procedure Laterality Date    MRI BREAST  "BIOPSY LEFT (ALL INCLUSIVE) Left 4/16/2014     Social History   Social History     Substance and Sexual Activity   Alcohol Use Never     Social History     Substance and Sexual Activity   Drug Use Never     E-Cigarette/Vaping     E-Cigarette/Vaping Substances     Social History     Tobacco Use   Smoking Status Never   Smokeless Tobacco Never     Family History: non-contributory    Meds/Allergies   all current active meds have been reviewed  No Known Allergies    Objective   Vitals: Blood pressure 147/63, pulse 73, temperature 98.3 °F (36.8 °C), temperature source Oral, resp. rate 18, height 5' 5\" (1.651 m), weight 84.3 kg (185 lb 13.6 oz), SpO2 94%.,Body mass index is 30.93 kg/m².      Intake/Output Summary (Last 24 hours) at 3/13/2024 0855  Last data filed at 3/13/2024 0500  Gross per 24 hour   Intake 712.5 ml   Output 600 ml   Net 112.5 ml     I/O last 24 hours:  In: 712.5 [I.V.:562.5; IV Piggyback:150]  Out: 600 [Urine:600]    Invasive Devices       Peripheral Intravenous Line  Duration             Peripheral IV 03/12/24 Proximal;Right;Ventral (anterior) Forearm <1 day              Drain  Duration             External Urinary Catheter <1 day                    Physical Exam: Madalyn is alert and appears to be in no acute distress.  She was able to provide the current month but not the year, provide the month and date of her birth but not the year, knew she was in a hospital but not the exact facility.  HEENT exam is grossly benign  Heart regular, pulses palpable  Lungs clear  Abdomen soft and nontender  Skin without lacerations or abrasions  Motor and sensory exams grossly intact except as limited by her injury  Ortho Exam: The left lower extremity is abducted, shortened and externally rotated.  She does have some tenderness to palpation of the proximal left thigh and pain with any attempt at left hip range of motion.  She denies any tenderness to palpation throughout the remainder of the femur, the knee, the lower " leg or the ankle and foot.  The right lower extremity exam and bilateral upper extremity examinations are grossly benign.  The clavicles and ribs were nontender.  The spine, as best could be examined, is nontender.    Lab Results: CBC:   Lab Results   Component Value Date    WBC 10.04 03/13/2024    HGB 11.4 (L) 03/13/2024    HCT 36.0 03/13/2024    MCV 95 03/13/2024     03/13/2024    RBC 3.80 (L) 03/13/2024    MCH 30.0 03/13/2024    MCHC 31.7 03/13/2024    RDW 12.9 03/13/2024    MPV 9.4 03/13/2024    NRBC 0 03/13/2024     CMP:   Lab Results   Component Value Date    SODIUM 141 03/13/2024     03/13/2024    CO2 26 03/13/2024    BUN 14 03/13/2024    CREATININE 0.60 03/13/2024    CALCIUM 9.2 03/13/2024    AST 17 03/12/2024    ALT 12 03/12/2024    ALKPHOS 71 03/12/2024    EGFR 85 03/13/2024     PT/INR:   Lab Results   Component Value Date    INR 1.01 03/12/2024     Imaging Studies: X-rays reviewed included x-rays of her left knee, hip and pelvis.  These x-rays demonstrate an intertrochanteric fracture of the proximal left femur.  Osteopenia is noted.  EKG, Pathology, and Other Studies: I have personally reviewed pertinent reports.    VTE Prophylaxis: Sequential compression device (Venodyne)   No pharmacological VTE prophylaxis at this time pending surgery this evening.

## 2024-03-13 NOTE — PROGRESS NOTES
The patient's  and daughter were informed of the diagnosis and the treatment.  The risks of surgery were discussed in detail.  Plan will be to proceed with surgical fixation of her left hip intertrochanteric fracture this evening.  I have requested that the patient signed the consent but that her  also signed the consent.  She was somewhat more oriented today but still had some confusion as to the year of her birth and the name of the hospital she was in.  Her  and daughter are both agreeable with the plan to proceed with surgery and her  is agreeable to sign the consent in addition to the patient.

## 2024-03-13 NOTE — PLAN OF CARE
Problem: PAIN - ADULT  Goal: Verbalizes/displays adequate comfort level or baseline comfort level  Description: Interventions:  - Encourage patient to monitor pain and request assistance  - Assess pain using appropriate pain scale  - Administer analgesics based on type and severity of pain and evaluate response  - Implement non-pharmacological measures as appropriate and evaluate response  - Consider cultural and social influences on pain and pain management  - Notify physician/advanced practitioner if interventions unsuccessful or patient reports new pain  Outcome: Progressing     Problem: INFECTION - ADULT  Goal: Absence or prevention of progression during hospitalization  Description: INTERVENTIONS:  - Assess and monitor for signs and symptoms of infection  - Monitor lab/diagnostic results  - Monitor all insertion sites, i.e. indwelling lines, tubes, and drains  - Monitor endotracheal if appropriate and nasal secretions for changes in amount and color  - [place] appropriate cooling/warming therapies per order  - Administer medications as ordered  - Instruct and encourage patient and family to use good hand hygiene technique  - Identify and instruct in appropriate isolation precautions for identified infection/condition  Outcome: Progressing     Problem: SAFETY ADULT  Goal: Patient will remain free of falls  Description: INTERVENTIONS:  - Educate patient/family on patient safety including physical limitations  - Instruct patient to call for assistance with activity   - Consult OT/PT to assist with strengthening/mobility   - Keep Call bell within reach  - Keep bed low and locked with side rails adjusted as appropriate  - Keep care items and personal belongings within reach  - Initiate and maintain comfort rounds  - Make Fall Risk Sign visible to staff  - Offer Toileting every 2 Hours, in advance of need if unable to notify staff  - Initiate/Maintain bed/chair alarm for confusion, impulsivity, or noncompliance  with notifying staff  - Apply yellow socks and bracelet for high fall risk patients  - Consider moving patient to room near nurses station  Outcome: Progressing  Goal: Maintain or return to baseline ADL function  Description: INTERVENTIONS:  -  Assess patient's ability to carry out ADLs; assess patient's baseline for ADL function and identify physical deficits which impact ability to perform ADLs (bathing, care of mouth/teeth, toileting, grooming, dressing, etc.)  - Assess/evaluate cause of self-care deficits   - Assess range of motion  - Assess patient's mobility; develop plan if impaired  - Assess patient's need for assistive devices and provide as appropriate  - Encourage maximum independence but intervene and supervise when necessary  - Involve family in performance of ADLs  - Assess for home care needs following discharge   - Consider OT consult to assist with ADL evaluation and planning for discharge  - Provide patient education as appropriate  Outcome: Progressing  Goal: Maintains/Returns to pre admission functional level  Description: INTERVENTIONS:  - Perform AM-PAC 6 Click Basic Mobility/ Daily Activity assessment daily.  - Set and communicate daily mobility goal to care team and patient/family/caregiver.   - Collaborate with rehabilitation services on mobility goals if consulted  - Perform Range of Motion 3 times a day.  - Reposition patient every 2 hours.  - Out of bed for toileting when medically appropriate  - Record patient progress and toleration of activity level   Outcome: Progressing     Problem: DISCHARGE PLANNING  Goal: Discharge to home or other facility with appropriate resources  Description: INTERVENTIONS:  - Identify barriers to discharge w/patient and caregiver  - Arrange for needed discharge resources and transportation as appropriate  - Identify discharge learning needs (meds, wound care, etc.)  - Arrange for interpretive services to assist at discharge as needed  - Refer to Case  Management Department for coordinating discharge planning if the patient needs post-hospital services based on physician/advanced practitioner order or complex needs related to functional status, cognitive ability, or social support system  Outcome: Progressing     Problem: Knowledge Deficit  Goal: Patient/family/caregiver demonstrates understanding of disease process, treatment plan, medications, and discharge instructions  Description: Complete learning assessment and assess knowledge base.  Interventions:  - Provide teaching at level of understanding  - Provide teaching via preferred learning methods  Outcome: Progressing     Problem: METABOLIC, FLUID AND ELECTROLYTES - ADULT  Goal: Electrolytes maintained within normal limits  Description: INTERVENTIONS:  - Monitor labs and assess patient for signs and symptoms of electrolyte imbalances  - Administer electrolyte replacement as ordered  - Monitor response to electrolyte replacements, including repeat lab results as appropriate  - Instruct patient on fluid and nutrition as appropriate  Outcome: Progressing  Goal: Fluid balance maintained  Description: INTERVENTIONS:  - Monitor labs   - Monitor I/O and WT  - Instruct patient on fluid and nutrition as appropriate  - Assess for signs & symptoms of volume excess or deficit  Outcome: Progressing     Problem: Prexisting or High Potential for Compromised Skin Integrity  Goal: Skin integrity is maintained or improved  Description: INTERVENTIONS:  - Identify patients at risk for skin breakdown  - Assess and monitor skin integrity  - Assess and monitor nutrition and hydration status  - Monitor labs   - Assess for incontinence   - Turn and reposition patient  - Assist with mobility/ambulation  - Relieve pressure over bony prominences  - Avoid friction and shearing  - Provide appropriate hygiene as needed including keeping skin clean and dry  - Evaluate need for skin moisturizer/barrier cream  - Collaborate with  interdisciplinary team   - Patient/family teaching  - Consider wound care consult   Outcome: Progressing

## 2024-03-13 NOTE — CASE MANAGEMENT
Case Management Assessment & Discharge Planning Note    Patient name Madalyn Dhaliwal  Location /-01 MRN [id number]  : 1940 Date 3/13/2024       Current Admission Date: 3/12/2024  Current Admission Diagnosis:Closed fracture of left hip with routine healing   Patient Active Problem List    Diagnosis Date Noted    Obesity, Class I, BMI 30-34.9 2024    Closed fracture of left hip with routine healing 2024    Fall from standing 2024      LOS (days): 0  Geometric Mean LOS (GMLOS) (days): 2.8  Days to GMLOS:2.8     OBJECTIVE:              Current admission status: Inpatient  Referral Reason:  (dc planning)    Preferred Pharmacy:   [facility] [address]   Phone: [phone] Fax: [phone]    Primary Care Provider: No primary care provider on file.    Primary Insurance: [employer]  Secondary Insurance:     CM met with patient, spouse and daughter at the bedside,baseline information  was obtained. CM discussed the role of CM in helping the patient develop a discharge plan and assist the patient in carry out their plan.      ASSESSMENT:  Active Health Care Proxies    There are no active Health Care Proxies on file.       Advance Directives  Does patient have a Health Care POA?: Yes  Does patient have Advance Directives?: Yes  Advance Directives: Living will  Primary Contact: Brigido Dhaliwal (Spouse)  [phone]         Readmission Root Cause  30 Day Readmission: No    Patient Information  Admitted from:: Home  Mental Status: Alert  During Assessment patient was accompanied by: Spouse, Daughter  Assessment information provided by:: Patient, Spouse, Daughter  Primary Caregiver: Self  Support Systems: Spouse/significant other, Children  County of Residence[place] [facility]  What city do you live in?: [facility]  Home entry access options. Select all that apply.: [place] (2 steps)  Type of Current Residence: 2 story home  Upon  entering residence, is there a bedroom on the main floor (no further steps)?: Yes  Upon entering residence, is there a bathroom on the main floor (no further steps)?: Yes  Living Arrangements: Lives w/ Spouse/significant other  Is patient a ?: No    Activities of Daily Living Prior to Admission  Functional Status: Independent  Completes ADLs independently?: Yes  Ambulates independently?: Yes  Does patient use assisted devices?: Yes  Assisted Devices (DME) used: Straight Cane  Does patient currently own DME?: Yes  What DME does the patient currently own?: Straight Cane, Walker  Does patient have a history of Outpatient Therapy (PT/OT)?: No  Does the patient have a history of Short-Term Rehab?: No  Does patient have a history of HHC?: No  Does patient currently have HHC?: No         Patient Information Continued  Income Source: Pension/FDC  Does patient have prescription coverage?: Yes  Does patient receive dialysis treatments?: No  Does patient have a history of substance abuse?: No  Does patient have a history of Mental Health Diagnosis?: No         Means of Transportation  Means of Transport to Appts:: Drives Self      Social Determinants of Health (SDOH)      Flowsheet Row Most Recent Value   Housing Stability    In the last 12 months, was there a time when you were not able to pay the mortgage or rent on time? N   In the last 12 months, how many places have you lived? 1   In the last 12 months, was there a time when you did not have a steady place to sleep or slept in a shelter (including now)? N   Transportation Needs    In the past 12 months, has lack of transportation kept you from medical appointments or from getting medications? no   Food Insecurity    Within the past 12 months, you worried that your food would run out before you got the money to buy more. Never true   Within the past 12 months, the food you bought just didn't last and you didn't have money to get more. Never true   Utilities     In the past 12 months has the electric, gas, oil, or water company threatened to shut off services in your home? No            DISCHARGE DETAILS:    Discharge planning discussed with:: patient, spouse and daughter  Freedom of Choice: Yes  Comments - Freedom of Choice: Discussed anticipated dc plan will be dependent on functional status post op. Anticipating STR, family choice is [facility] if they do accept [employer]. Open to a blanket referral however [facility] is 1st choice.  CM contacted family/caregiver?: Yes  Were Treatment Team discharge recommendations reviewed with patient/caregiver?: Yes          Contacts  Patient Contacts: Spouse and Daughter  Relationship to Patient:: Family  Contact Method: In Person  Reason/Outcome: Discharge Planning         DME Referral Provided  Referral made for DME?: No    Other Referral/Resources/Interventions Provided:  Referral Comments: Anticipate STR post op         Treatment Team Recommendation: Short Term Rehab     Transport at Discharge : Wheelchair van       CM will follow and make referrals once therapy sees the patient post op.

## 2024-03-13 NOTE — PROGRESS NOTES
[facility]  Progress Note  Name: Madalyn Dhaliwal I  MRN: [id number]  Unit/Bed#: -01 I Date of Admission: 3/12/2024   Date of Service: 3/13/2024 I Hospital Day: 0    Assessment/Plan   * Closed fracture of left hip with routine healing  Assessment & Plan  Patient presents this evening after sustaining mechanical fall from standing. Patient ambulating when hit with high morris of wind making her lose her balance and fell upon left hip, denies striking head. Patient without complaints of loss of consciousness, lightheadedness, dizziness, shaking behavior, urinary or bladder incontinence  with pain and inability to ambulate after the event  xray revealed left femoral neck fracture, nondisplaced, noncomminuted  Orthopedics recommending admit and NPO after midnight. Given TXA x1. Plan for surgical fixation 3/13. Will need DVT prophylaxis for 3 weeks post op  IVF while NPO  Index of suspicion for purely mechanical fall from standing  Pain management  PT/OT post op    Probability of perioperative myocardial infarction or cardiac arrest- 0.2%     Obesity, Class I, BMI 30-34.9  Assessment & Plan  Continue to educate on lifestyle changes    Fall from standing  Assessment & Plan  As above  No history of syncope or seizure  Per history, appears purely mechanical  Check orthostatics  EKG and UA normal  Will require PT/OT evaluation             VTE Pharmacologic Prophylaxis:   Moderate Risk (Score 3-4) - Pharmacological DVT Prophylaxis Ordered: enoxaparin (Lovenox).    Mobility:   Basic Mobility Inpatient Raw Score: 13  -HLM Goal: 4: Move to chair/commode  JH-HLM Achieved: 2: Bed activities/Dependent transfer  HLM Goal NOT achieved. Continue with multidisciplinary rounding and encourage appropriate mobility to improve upon HLM goals.    Patient Centered Rounds: I performed bedside rounds with nursing staff today.   Discussions with Specialists or Other Care Team Provider: nursing, orthopedics and  cm    Education and Discussions with Family / Patient: Updated  ( and daughter) at bedside.    Total Time Spent on Date of Encounter in care of patient:  This time was spent on one or more of the following: performing physical exam; counseling and coordination of care; obtaining or reviewing history; documenting in the medical record; reviewing/ordering tests, medications or procedures; communicating with other healthcare professionals and discussing with patient's family/caregivers.    Current Length of Stay: 0 day(s)  Current Patient Status: Inpatient   Certification Statement: The patient will continue to require additional inpatient hospital stay due to requiring surgical fixation of left hip fracture  Discharge Plan: Anticipate discharge in 24-48 hrs to discharge location to be determined pending rehab evaluations.    Code Status: Level 1 - Full Code    Subjective:   Patient states that pain is well controlled and comes and goes. Denies chest pain or shortness of breath. Does not offer any complaints at this time.    Objective:     Vitals:   Temp (24hrs), Av.2 °F (36.8 °C), Min:97.3 °F (36.3 °C), Max:98.8 °F (37.1 °C)    Temp:  [97.3 °F (36.3 °C)-98.8 °F (37.1 °C)] 98.3 °F (36.8 °C)  HR:  [73-89] 73  Resp:  [16-20] 18  BP: (128-165)/(63-81) 147/63  SpO2:  [94 %-95 %] 94 %  Body mass index is 30.93 kg/m².     Input and Output Summary (last 24 hours):     Intake/Output Summary (Last 24 hours) at 3/13/2024 1038  Last data filed at 3/13/2024 0901  Gross per 24 hour   Intake 1095 ml   Output 600 ml   Net 495 ml       Physical Exam:   Physical Exam  Vitals reviewed.   Constitutional:       General: She is not in acute distress.     Appearance: Normal appearance. She is obese. She is not ill-appearing.   HENT:      Head: Normocephalic and atraumatic.      Nose: Nose normal.      Mouth/Throat:      Mouth: Mucous membranes are moist.      Pharynx: Oropharynx is clear.   Eyes:      Extraocular  Movements: Extraocular movements intact.      Conjunctiva/sclera: Conjunctivae normal.   Cardiovascular:      Rate and Rhythm: Normal rate and regular rhythm.      Pulses: Normal pulses.      Heart sounds: Normal heart sounds. No murmur heard.  Pulmonary:      Effort: Pulmonary effort is normal. No respiratory distress.      Breath sounds: Normal breath sounds. No wheezing or rhonchi.   Abdominal:      General: Abdomen is flat. Bowel sounds are normal. There is no distension.      Palpations: Abdomen is soft.      Tenderness: There is no abdominal tenderness. There is no guarding.   Musculoskeletal:         General: Normal range of motion.      Cervical back: Normal range of motion.      Right lower leg: No edema.      Left lower leg: No edema.   Skin:     General: Skin is warm.   Neurological:      General: No focal deficit present.      Mental Status: She is alert and oriented to person, place, and time. Mental status is at baseline.      Motor: No weakness.      Comments: Oriented to self, family, place, month and year   Psychiatric:         Mood and Affect: Mood normal.         Behavior: Behavior normal.         Thought Content: Thought content normal.         Judgment: Judgment normal.          Additional Data:     Labs:  Results from last 7 days   Lab Units 03/13/24  0454   WBC Thousand/uL 10.04   HEMOGLOBIN g/dL 11.4*   HEMATOCRIT % 36.0   PLATELETS Thousands/uL 269   NEUTROS PCT % 76*   LYMPHS PCT % 15   MONOS PCT % 8   EOS PCT % 0     Results from last 7 days   Lab Units 03/13/24 0454 03/1940   SODIUM mmol/L 141 143   POTASSIUM mmol/L 4.2 4.1   CHLORIDE mmol/L 108 108   CO2 mmol/L 26 26   BUN mg/dL 14 17   CREATININE mg/dL 0.60 0.73   ANION GAP mmol/L 7 9   CALCIUM mg/dL 9.2 9.6   ALBUMIN g/dL  --  4.0   TOTAL BILIRUBIN mg/dL  --  0.65   ALK PHOS U/L  --  71   ALT U/L  --  12   AST U/L  --  17   GLUCOSE RANDOM mg/dL 119 136     Results from last 7 days   Lab Units 03/12/24  1941   INR  1.01                    Lines/Drains:  Invasive Devices       Peripheral Intravenous Line  Duration             Peripheral IV 03/12/24 Proximal;Right;Ventral (anterior) Forearm <1 day              Drain  Duration             External Urinary Catheter <1 day                          Imaging: Reviewed radiology reports from this admission including: xray(s)    Recent Cultures (last 7 days):         Last 24 Hours Medication List:   Current Facility-Administered Medications   Medication Dose Route Frequency Provider Last Rate    acetaminophen  650 mg Oral Q4H PRN Vitaly Rueter, DO      docusate sodium  100 mg Oral BID Vitaly Rueter, DO      lactated ringers  75 mL/hr Intravenous Continuous Vitaly Rueter, DO 75 mL/hr (03/13/24 0825)    ondansetron  4 mg Intravenous Q6H PRN Vitaly Saul, DO      oxyCODONE  2.5 mg Oral Q6H PRN Viry Espinoza PA-C      oxyCODONE  5 mg Oral Q6H PRN Viry Espinoza PA-C          Today, Patient Was Seen By: Viry Espinoza PA-C    **Please Note: This note may have been constructed using a voice recognition system.**

## 2024-03-13 NOTE — ASSESSMENT & PLAN NOTE
Patient presents this evening after sustaining mechanical fall from standing. Patient ambulating when hit with high morris of wind making her lose her balance and fell upon left hip, denies striking head. Patient without complaints of loss of consciousness, lightheadedness, dizziness, shaking behavior, urinary or bladder incontinence  with pain and inability to ambulate after the event  xray revealed left femoral neck fracture, nondisplaced, noncomminuted  Orthopedics recommending admit and NPO after midnight. Given TXA x1. Plan for surgical fixation 3/13. Will need DVT prophylaxis for 3 weeks post op  IVF while NPO  Index of suspicion for purely mechanical fall from standing  Pain management  PT/OT post op    Probability of perioperative myocardial infarction or cardiac arrest- 0.2%

## 2024-03-13 NOTE — PLAN OF CARE
Problem: PAIN - ADULT  Goal: Verbalizes/displays adequate comfort level or baseline comfort level  Description: Interventions:  - Encourage patient to monitor pain and request assistance  - Assess pain using appropriate pain scale  - Administer analgesics based on type and severity of pain and evaluate response  - Implement non-pharmacological measures as appropriate and evaluate response  - Consider cultural and social influences on pain and pain management  - Notify physician/advanced practitioner if interventions unsuccessful or patient reports new pain  3/13/2024 0942 by Susan Fields RN  Outcome: Progressing  3/13/2024 0940 by Susan Fields RN  Outcome: Progressing     Problem: INFECTION - ADULT  Goal: Absence or prevention of progression during hospitalization  Description: INTERVENTIONS:  - Assess and monitor for signs and symptoms of infection  - Monitor lab/diagnostic results  - Monitor all insertion sites, i.e. indwelling lines, tubes, and drains  - Monitor endotracheal if appropriate and nasal secretions for changes in amount and color  - [place] appropriate cooling/warming therapies per order  - Administer medications as ordered  - Instruct and encourage patient and family to use good hand hygiene technique  - Identify and instruct in appropriate isolation precautions for identified infection/condition  3/13/2024 0942 by Susan Fields RN  Outcome: Progressing  3/13/2024 0940 by Susan Fields RN  Outcome: Progressing     Problem: SAFETY ADULT  Goal: Patient will remain free of falls  Description: INTERVENTIONS:  - Educate patient/family on patient safety including physical limitations  - Instruct patient to call for assistance with activity   - Consult OT/PT to assist with strengthening/mobility   - Keep Call bell within reach  - Keep bed low and locked with side rails adjusted as appropriate  - Keep care items and personal belongings within reach  - Initiate and maintain comfort rounds  - Make Fall  Risk Sign visible to staff  - Offer Toileting every 2 Hours, in advance of need if unable to notify staff  - Initiate/Maintain bed/chair alarm for confusion, impulsivity, or noncompliance with notifying staff  - Apply yellow socks and bracelet for high fall risk patients  - Consider moving patient to room near nurses station  3/13/2024 0942 by Susan Fields RN  Outcome: Progressing  3/13/2024 0940 by Susan Fields RN  Outcome: Progressing  Goal: Maintain or return to baseline ADL function  Description: INTERVENTIONS:  -  Assess patient's ability to carry out ADLs; assess patient's baseline for ADL function and identify physical deficits which impact ability to perform ADLs (bathing, care of mouth/teeth, toileting, grooming, dressing, etc.)  - Assess/evaluate cause of self-care deficits   - Assess range of motion  - Assess patient's mobility; develop plan if impaired  - Assess patient's need for assistive devices and provide as appropriate  - Encourage maximum independence but intervene and supervise when necessary  - Involve family in performance of ADLs  - Assess for home care needs following discharge   - Consider OT consult to assist with ADL evaluation and planning for discharge  - Provide patient education as appropriate  3/13/2024 0942 by Susan Fields RN  Outcome: Progressing  3/13/2024 0940 by Susan Fields RN  Outcome: Progressing  Goal: Maintains/Returns to pre admission functional level  Description: INTERVENTIONS:  - Perform AM-PAC 6 Click Basic Mobility/ Daily Activity assessment daily.  - Set and communicate daily mobility goal to care team and patient/family/caregiver.   - Collaborate with rehabilitation services on mobility goals if consulted  - Perform Range of Motion 3 times a day.  - Reposition patient every 2 hours.  - Out of bed for toileting when medically appropriate  - Record patient progress and toleration of activity level   3/13/2024 0942 by Susan Fields RN  Outcome:  Progressing  3/13/2024 0940 by Susan Fields RN  Outcome: Progressing

## 2024-03-13 NOTE — ASSESSMENT & PLAN NOTE
As above  No history of syncope or seizure  Per history, appears purely mechanical  Check orthostatics  EKG and UA normal  Will require PT/OT evaluation

## 2024-03-14 PROBLEM — R41.0 DELIRIUM: Status: ACTIVE | Noted: 2024-03-14

## 2024-03-14 LAB
ANION GAP SERPL CALCULATED.3IONS-SCNC: 8 MMOL/L (ref 4–13)
BUN SERPL-MCNC: 14 MG/DL (ref 5–25)
CALCIUM SERPL-MCNC: 9 MG/DL (ref 8.4–10.2)
CHLORIDE SERPL-SCNC: 107 MMOL/L (ref 96–108)
CO2 SERPL-SCNC: 24 MMOL/L (ref 21–32)
CREAT SERPL-MCNC: 0.7 MG/DL (ref 0.6–1.3)
ERYTHROCYTE [DISTWIDTH] IN BLOOD BY AUTOMATED COUNT: 12.7 % (ref 11.6–15.1)
GFR SERPL CREATININE-BSD FRML MDRD: 81 ML/MIN/1.73SQ M
GLUCOSE SERPL-MCNC: 156 MG/DL (ref 65–140)
HBV SURFACE AG SER QL: NORMAL
HCT VFR BLD AUTO: 30.5 % (ref 34.8–46.1)
HCV AB SER QL: NORMAL
HGB BLD-MCNC: 9.9 G/DL (ref 11.5–15.4)
HIV 1+2 AB+HIV1 P24 AG SERPL QL IA: NORMAL
HIV1 P24 AG SER QL: NORMAL
MCH RBC QN AUTO: 30.6 PG (ref 26.8–34.3)
MCHC RBC AUTO-ENTMCNC: 32.5 G/DL (ref 31.4–37.4)
MCV RBC AUTO: 94 FL (ref 82–98)
PLATELET # BLD AUTO: 279 THOUSANDS/UL (ref 149–390)
PMV BLD AUTO: 9.3 FL (ref 8.9–12.7)
POTASSIUM SERPL-SCNC: 4 MMOL/L (ref 3.5–5.3)
RBC # BLD AUTO: 3.24 MILLION/UL (ref 3.81–5.12)
SODIUM SERPL-SCNC: 139 MMOL/L (ref 135–147)
WBC # BLD AUTO: 14.11 THOUSAND/UL (ref 4.31–10.16)

## 2024-03-14 PROCEDURE — 97110 THERAPEUTIC EXERCISES: CPT

## 2024-03-14 PROCEDURE — 99024 POSTOP FOLLOW-UP VISIT: CPT | Performed by: PHYSICIAN ASSISTANT

## 2024-03-14 PROCEDURE — 86803 HEPATITIS C AB TEST: CPT | Performed by: PHYSICIAN ASSISTANT

## 2024-03-14 PROCEDURE — 87340 HEPATITIS B SURFACE AG IA: CPT | Performed by: PHYSICIAN ASSISTANT

## 2024-03-14 PROCEDURE — 97535 SELF CARE MNGMENT TRAINING: CPT

## 2024-03-14 PROCEDURE — 97167 OT EVAL HIGH COMPLEX 60 MIN: CPT

## 2024-03-14 PROCEDURE — 85027 COMPLETE CBC AUTOMATED: CPT | Performed by: ORTHOPAEDIC SURGERY

## 2024-03-14 PROCEDURE — 87806 HIV AG W/HIV1&2 ANTB W/OPTIC: CPT | Performed by: PHYSICIAN ASSISTANT

## 2024-03-14 PROCEDURE — 99232 SBSQ HOSP IP/OBS MODERATE 35: CPT

## 2024-03-14 PROCEDURE — 80048 BASIC METABOLIC PNL TOTAL CA: CPT | Performed by: ORTHOPAEDIC SURGERY

## 2024-03-14 PROCEDURE — 97163 PT EVAL HIGH COMPLEX 45 MIN: CPT

## 2024-03-14 RX ADMIN — DOCUSATE SODIUM 100 MG: 100 CAPSULE, LIQUID FILLED ORAL at 09:13

## 2024-03-14 RX ADMIN — ENOXAPARIN SODIUM 40 MG: 40 INJECTION SUBCUTANEOUS at 09:13

## 2024-03-14 RX ADMIN — CEFAZOLIN SODIUM 2000 MG: 2 SOLUTION INTRAVENOUS at 09:13

## 2024-03-14 RX ADMIN — CEFAZOLIN SODIUM 2000 MG: 2 SOLUTION INTRAVENOUS at 17:05

## 2024-03-14 RX ADMIN — DOCUSATE SODIUM 100 MG: 100 CAPSULE, LIQUID FILLED ORAL at 17:05

## 2024-03-14 RX ADMIN — CEFAZOLIN SODIUM 2000 MG: 2 SOLUTION INTRAVENOUS at 00:17

## 2024-03-14 NOTE — ASSESSMENT & PLAN NOTE
As above  No history of syncope or seizure  Per history, appears purely mechanical  Check orthostatics  EKG and UA normal  Will require PT/OT evaluation - recommend rehab

## 2024-03-14 NOTE — UTILIZATION REVIEW
NOTIFICATION OF INPATIENT ADMISSION   AUTHORIZATION REQUEST   SERVICING FACILITY:   [place] [address]  Tax ID: [id number]  NPI: [id number] ATTENDING PROVIDER:  Attending Name and NPI#: Dixon Zacarias Md [[id number]]  Address: [address] [place] [address]  Phone: [phone]   ADMISSION INFORMATION:  Place of Service: Inpatient [facility]  Place of Service Code: 21  Inpatient Admission Date/Time: 3/13/24  9:30 AM  Discharge Date/Time: No discharge date for patient encounter.  Admitting Diagnosis Code/Description:  Knee pain [M25.569]  Closed fracture of left hip, initial encounter ([facility]) [S72.002A]     UTILIZATION REVIEW CONTACT:  Sri Pablo Utilization   Network Utilization Review Department  Phone: [phone]  Fax [phone]  Email: [email or web address]  Contact for approvals/pending authorizations, clinical reviews, and discharge.     PHYSICIAN ADVISORY SERVICES:  Medical Necessity Denial & Ytns-to-Vijw Review  Phone: [phone]  Fax: [phone]  Email: [email or web address]     DISCHARGE SUPPORT TEAM:  For Patients Discharge Needs & Updates  Phone: [phone] opt. 2 Fax: [phone]  Email: [email or web address]

## 2024-03-14 NOTE — PLAN OF CARE
Problem: PHYSICAL THERAPY ADULT  Goal: Performs mobility at highest level of function for planned discharge setting.  See evaluation for individualized goals.  Description: Treatment/Interventions: ADL retraining, Functional transfer training, LE strengthening/ROM, Elevations, Therapeutic exercise, Endurance training, Patient/family training, Equipment eval/education, Bed mobility, Gait training, Cognitive reorientation, Compensatory technique education, Spoke to nursing, Spoke to case management, OT          See flowsheet documentation for full assessment, interventions and recommendations.  Note: Prognosis: Good  Problem List: Decreased strength, Decreased endurance, Impaired balance, Decreased range of motion, Decreased mobility, Decreased cognition, Impaired judgement, Decreased safety awareness, Obesity, Decreased skin integrity, Pain  Assessment: Pt is a 83 y.o. female seen for PT evaluation s/p admission to [facility] on 3/12/2024 with Closed fracture of left hip with routine healing.  Order placed for PT services.  Upon evaluation: Pt is presenting with impaired functional mobility due to pain, decreased strength, decreased ROM, decreased endurance, impaired balance, gait deviations, impaired cognition, decreased safety awareness, impaired judgment, fall risk, LE edema, and impaired skin integrity requiring  minimal to maximal assistance for bed mobility, moderate of 1 to maximal of 2 assistance for transfers, and maximal of 2 assistance for ambulation with RW . Pt's clinical presentation is currently unpredictable given the functional mobility deficits above, especially weakness, decreased ROM, edema of extremities, decreased skin integrity, decreased endurance, impaired balance, gait deviations, pain, decreased activity tolerance, decreased functional mobility tolerance, decreased safety awareness, impaired judgement, and decreased cognition, coupled with fall risks as indicated by  AM-PAC 6-Clicks: 10/24 as well as hx of falls, impaired balance, polypharmacy, impaired judgement, decreased safety awareness, decreased cognition, and obesity and combined with medical complications of pain impacting overall mobility status, abnormal H&H, abnormal WBCs, abnormal blood sugars, need for input for mobility technique/safety, and closed fracture of left hip s/p repair, delirium, leukemoid reaction .  Pt's PMHx and comorbidities that may affect physical performance and progress include: obesity. Personal factors affecting pt at time of IE include: step(s) to enter environment, limited home support, advanced age, inability to perform IADLs, inability to perform ADLs, inability to navigate level surfaces without external assistance, inability to ambulate household distances, limited insight into impairments, and recent fall(s)/fall history. Pt will benefit from continued skilled PT services to address deficits as defined above and to maximize level of functional mobility to facilitate return toward PLOF and improved QOL. From PT/mobility standpoint, recommendation at time of d/c would be Level I (Maximum Resource Intensity) and with family and/or caregiver support in order to reduce fall risk and maximize pt's functional independence and consistency with mobility. Recommend trial with walker next 1-2 sessions and ther ex next 1-2 sessions.  Barriers to Discharge: Decreased caregiver support, Inaccessible home environment  Barriers to Discharge Comments: requires increased assistance to complete mobility, increased fall risk, impaired cognition post op per daughter patient is confused at times, but not currently at baseline.  Rehab Resource Intensity Level, PT: I (Maximum Resource Intensity)    See flowsheet documentation for full assessment.

## 2024-03-14 NOTE — PROGRESS NOTES
[facility]  Progress Note  Name: Madalyn Dhaliwal I  MRN: [id number]  Unit/Bed#: -01 I Date of Admission: 3/12/2024   Date of Service: 3/14/2024 I Hospital Day: 1    Assessment/Plan   * Closed fracture of left hip with routine healing  Assessment & Plan  Patient presents this evening after sustaining mechanical fall from standing. Patient ambulating when hit with high morris of wind making her lose her balance and fell upon left hip, denies striking head. Patient without complaints of loss of consciousness, lightheadedness, dizziness, shaking behavior, urinary or bladder incontinence  with pain and inability to ambulate after the event  xray revealed left femoral neck fracture, nondisplaced, noncomminuted  Orthopedics consulted - had surgical fixation 3/13. Will need DVT prophylaxis for 3 weeks post op  Index of suspicion for purely mechanical fall from standing  Pain management  PT/OT post op    Delirium  Assessment & Plan  Post op and hospital delirium   Family reports intermittent confusion at baseline   Reorient as needed    Obesity, Class I, BMI 30-34.9  Assessment & Plan  Continue to educate on lifestyle changes    Leukemoid reaction  Assessment & Plan  Likely secondary to post op  No signs of infection, remains afebrile  Plain film chest x-ray unrevealing  UA normal  Monitor daily cbc    Fall from standing  Assessment & Plan  As above  No history of syncope or seizure  Per history, appears purely mechanical  Check orthostatics  EKG and UA normal  Will require PT/OT evaluation - recommend rehab             VTE Pharmacologic Prophylaxis:   Moderate Risk (Score 3-4) - Pharmacological DVT Prophylaxis Ordered: enoxaparin (Lovenox).    Mobility:   Basic Mobility Inpatient Raw Score: 13  JH-HLM Goal: 4: Move to chair/commode  JH-HLM Achieved: 4: Move to chair/commode  HLM Goal NOT achieved. Continue with multidisciplinary rounding and encourage appropriate mobility to improve upon  M goals.    Patient Centered Rounds: I performed bedside rounds with nursing staff today.   Discussions with Specialists or Other Care Team Provider: nursing and cm    Education and Discussions with Family / Patient: Updated  () via phone.    Total Time Spent on Date of Encounter in care of patient:  This time was spent on one or more of the following: performing physical exam; counseling and coordination of care; obtaining or reviewing history; documenting in the medical record; reviewing/ordering tests, medications or procedures; communicating with other healthcare professionals and discussing with patient's family/caregivers.    Current Length of Stay: 1 day(s)  Current Patient Status: Inpatient   Certification Statement: The patient will continue to require additional inpatient hospital stay due to requiring monitoring of cbc post op  Discharge Plan: Anticipate discharge in 24-48 hrs to discharge location to be determined pending rehab evaluations.    Code Status: Level 1 - Full Code    Subjective:   Patient states that she is feeling well, denies chest pain or shortness of breath. States that she has some intermittent leg pain, but it is managed well. Does not offer any complaints at this time.     Objective:     Vitals:   Temp (24hrs), Av.9 °F (36.6 °C), Min:97 °F (36.1 °C), Max:98.6 °F (37 °C)    Temp:  [97 °F (36.1 °C)-98.6 °F (37 °C)] 98.3 °F (36.8 °C)  HR:  [] 94  Resp:  [15-23] 18  BP: (102-176)/(55-99) 105/59  SpO2:  [92 %-99 %] 93 %  Body mass index is 31.04 kg/m².     Input and Output Summary (last 24 hours):     Intake/Output Summary (Last 24 hours) at 3/14/2024 1105  Last data filed at 3/14/2024 0601  Gross per 24 hour   Intake 2077.5 ml   Output 720 ml   Net 1357.5 ml       Physical Exam:   Physical Exam  Vitals reviewed.   Constitutional:       General: She is not in acute distress.     Appearance: Normal appearance. She is not ill-appearing.   HENT:      Head:  Normocephalic and atraumatic.      Nose: Nose normal.      Mouth/Throat:      Mouth: Mucous membranes are moist.      Pharynx: Oropharynx is clear.   Eyes:      Extraocular Movements: Extraocular movements intact.      Conjunctiva/sclera: Conjunctivae normal.   Cardiovascular:      Rate and Rhythm: Normal rate and regular rhythm.      Pulses: Normal pulses.      Heart sounds: Normal heart sounds. No murmur heard.  Pulmonary:      Effort: Pulmonary effort is normal. No respiratory distress.      Breath sounds: Normal breath sounds. No wheezing or rhonchi.   Abdominal:      General: Abdomen is flat. Bowel sounds are normal. There is no distension.      Palpations: Abdomen is soft.      Tenderness: There is no abdominal tenderness. There is no guarding.   Musculoskeletal:         General: Normal range of motion.      Cervical back: Normal range of motion.      Right lower leg: No edema.      Left lower leg: No edema.   Skin:     General: Skin is warm.   Neurological:      General: No focal deficit present.      Mental Status: She is alert. Mental status is at baseline.      Motor: No weakness.      Comments: Oriented to self, family, month and year. Did not know that she was in the hospital, when she came in or why   Psychiatric:         Mood and Affect: Mood normal.         Behavior: Behavior normal.         Thought Content: Thought content normal.         Judgment: Judgment normal.          Additional Data:     Labs:  Results from last 7 days   Lab Units 03/14/24  0549 03/13/24  0454   WBC Thousand/uL 14.11* 10.04   HEMOGLOBIN g/dL 9.9* 11.4*   HEMATOCRIT % 30.5* 36.0   PLATELETS Thousands/uL 279 269   NEUTROS PCT %  --  76*   LYMPHS PCT %  --  15   MONOS PCT %  --  8   EOS PCT %  --  0     Results from last 7 days   Lab Units 03/14/24  0549 03/13/24  0454 03/12/24  1942   SODIUM mmol/L 139   < > 143   POTASSIUM mmol/L 4.0   < > 4.1   CHLORIDE mmol/L 107   < > 108   CO2 mmol/L 24   < > 26   BUN mg/dL 14   < > 17    CREATININE mg/dL 0.70   < > 0.73   ANION GAP mmol/L 8   < > 9   CALCIUM mg/dL 9.0   < > 9.6   ALBUMIN g/dL  --   --  4.0   TOTAL BILIRUBIN mg/dL  --   --  0.65   ALK PHOS U/L  --   --  71   ALT U/L  --   --  12   AST U/L  --   --  17   GLUCOSE RANDOM mg/dL 156*   < > 136    < > = values in this interval not displayed.     Results from last 7 days   Lab Units 03/12/24  1941   INR  1.01                   Lines/Drains:  Invasive Devices       Peripheral Intravenous Line  Duration             Peripheral IV 03/13/24 Left;Ventral (anterior) Forearm <1 day              Drain  Duration             External Urinary Catheter 1 day                          Imaging: Reviewed radiology reports from this admission including: xray(s)    Recent Cultures (last 7 days):         Last 24 Hours Medication List:   Current Facility-Administered Medications   Medication Dose Route Frequency Provider Last Rate    cefazolin  2,000 mg Intravenous Q8H Prabhu Diverio 2,000 mg (03/14/24 0913)    docusate sodium  100 mg Oral BID Prabhu Diverio      enoxaparin  40 mg Subcutaneous Daily Prabhu Diverio      lactated ringers  1,000 mL Intravenous Once PRN Prabhu Diverio      And    lactated ringers  1,000 mL Intravenous Once PRN Prabhu Diverio      ondansetron  4 mg Intravenous Q6H PRN Prabhu Diverio      oxyCODONE  10 mg Oral Q6H PRN Prabhu Diverio      oxyCODONE  5 mg Oral Q6H PRN Prabhu Diverio      sodium chloride  1,000 mL Intravenous Once PRN Prabuh Diverio      And    sodium chloride  1,000 mL Intravenous Once PRN Prabhu Diverio          Today, Patient Was Seen By: Viry Espinoza PA-C    **Please Note: This note may have been constructed using a voice recognition system.**

## 2024-03-14 NOTE — ASSESSMENT & PLAN NOTE
Patient presents this evening after sustaining mechanical fall from standing. Patient ambulating when hit with high morris of wind making her lose her balance and fell upon left hip, denies striking head. Patient without complaints of loss of consciousness, lightheadedness, dizziness, shaking behavior, urinary or bladder incontinence  with pain and inability to ambulate after the event  xray revealed left femoral neck fracture, nondisplaced, noncomminuted  Orthopedics consulted - had surgical fixation 3/13. Will need DVT prophylaxis for 3 weeks post op  Index of suspicion for purely mechanical fall from standing  Pain management  PT/OT post op - recommend rehab

## 2024-03-14 NOTE — PHYSICAL THERAPY NOTE
"   PHYSICAL THERAPY EVALUATION  NAME:  Madalyn Dhaliwal  DATE: 03/14/24    AGE:   83 y.o.  Mrn:   [id number]  ADMIT DX:  Knee pain [M25.569]  Closed fracture of left hip, initial encounter ([facility]) [S72.002A]    History reviewed. No pertinent past medical history.  Length Of Stay: 1  Performed at least 2 patient identifiers during session: Name and Birthday  PHYSICAL THERAPY EVALUATION :    03/14/24 0921   PT Last Visit   PT Visit Date 03/14/24   Home Living   Type of Home House  (3 HA from front and back no HR and 1 HA from garage)   Home Layout One level  (R HR going down to basement)   Bathroom Shower/Tub Tub/shower unit   Bathroom Toilet Raised   Bathroom Equipment Grab bars in shower   Home Equipment Cane;Walker   Additional Comments Reports living in a 1SH with 1 HA from garage. Reports using cane for functional mobility. Has RW, but doesn't use.   Prior Function   Level of [place] Independent with ADLs;Independent with functional mobility;Independent with IADLS   Lives With Spouse   Receives Help From Family   IADLs Independent with driving;Independent with meal prep;Independent with medication management   Falls in the last 6 months 1 to 4   Comments Reports being independent with mobility, ADLs and IADLs.   General   Additional Pertinent History pt with bloody drainage from incision site. Pt is s/p Left - INSERTION NAIL IM FEMUR ANTEGRADE (TROCHANTERIC) on 3/13/24.   Cognition   Orientation Level Oriented to person;Oriented to time;Disoriented to place;Disoriented to situation  (Able to explain situation with cues. Oriented to month, disoriented to year-later stating 2024 with cues.)   Following Commands Follows one step commands with increased time or repetition   Comments repeated cues. pt easily distracted.   Subjective   Subjective \"I was at the dentist before this.\"   RLE Assessment   RLE Assessment WFL  (3+/5 except hip flexion 3-/5)   LLE Assessment   LLE Assessment   (ankle DF/PF WFL, knee " flexion ~ 60 degrees, hip flexion < 90 degrees. hip abd minimal. strength 2-/5)   Light Touch   RLE Light Touch Grossly intact   LLE Light Touch Grossly intact   Bed Mobility   Rolling L 4  Minimal assistance   Additional items Assist x 1;Increased time required;Verbal cues;Bedrails  (trunk management)   Supine to Sit 2  Maximal assistance   Additional items Assist x 1;Increased time required;Verbal cues;LE management  (Trunk management)   Additional Comments HOB flat without bedrail. manual cues for trunk and LE management. minAx1 for rolling to left with bedrail   Transfers   Sit to Stand 3  Moderate assistance   Additional items Assist x 1;Increased time required;Verbal cues   Stand to Sit 3  Moderate assistance   Additional items Assist x 1;Increased time required;Verbal cues   Stand pivot 2  Maximal assistance   Additional items Assist x 2;Increased time required;Verbal cues   Additional Comments use of RW. verbal cues for techique, sequence and hand placement. sit<>stand with RW wth modAx1 with manual cues for wt shifting and verbal cues for uprihgt posture. pt wiht decreased stance L LE. spt with RW with maxAx2 with manual cues for wt shifting and left LE advancement and verbal cues for sequence and technique.   Ambulation/Elevation   Gait pattern Wide NICHOLAS;Improper Weight shift;Decreased foot clearance;Decreased L stance;Short stride;Excessively slow;Step to   Gait Assistance 2  Maximal assist   Additional items Assist x 2   Assistive Device Rolling walker   Distance ambulated 3 steps forward with step to pattern leading with L LE with manual cues for wt shifting and left LE advancement and verbal cues for technique and sequence. chair follow for safety   Balance   Static Sitting Fair   Dynamic Sitting Fair -   Static Standing Poor +   Dynamic Standing Poor   Ambulatory Poor -   Activity Tolerance   Activity Tolerance Patient limited by fatigue;Patient limited by pain   Medical Staff Made Aware Emily WEBB  "  Nurse Made Aware Racheal MCCANN   Assessment   Prognosis Good   Problem List Decreased strength;Decreased endurance;Impaired balance;Decreased range of motion;Decreased mobility;Decreased cognition;Impaired judgement;Decreased safety awareness;Obesity;Decreased skin integrity;Pain   Barriers to Discharge Decreased caregiver support;Inaccessible home environment   Barriers to Discharge Comments requires increased assistance to complete mobility, increased fall risk, impaired cognition post op per daughter patient is confused at times, but not currently at baseline.   Goals   Patient Goals \"Go home\"   STG Expiration Date 03/28/24   PT Treatment Day 1   Plan   Treatment/Interventions ADL retraining;Functional transfer training;LE strengthening/ROM;Elevations;Therapeutic exercise;Endurance training;Patient/family training;Equipment eval/education;Bed mobility;Gait training;Cognitive reorientation;Compensatory technique education;Spoke to nursing;Spoke to case management;OT   PT Frequency 4-6x/wk   Discharge Recommendation   Rehab Resource Intensity Level, PT I (Maximum Resource Intensity)   AM-PAC Basic Mobility Inpatient   Turning in Flat Bed Without Bedrails 3   Lying on Back to Sitting on Edge of Flat Bed Without Bedrails 2   Moving Bed to Chair 1   Standing Up From Chair Using Arms 2   Walk in Room 1   Climb 3-5 Stairs With Railing 1   Basic Mobility Inpatient Raw Score 10   Turning Head Towards Sound 4   Follow Simple Instructions 3   Low Function Basic Mobility Raw Score  17   Low Function Basic Mobility Standardized Score  27.46   Highest Level Of Mobility   JH-HLM Goal 4: Move to chair/commode   JH-HLM Achieved 4: Move to chair/commode   Additional Treatment Session   Start Time 0955   End Time 1005   Treatment Assessment Pt tolerated session fairly. She requires min cues for proper completion of LE TE. She requires assistance for full completion left LE. Continue PT services to maximize LOF.   Equipment Use sitting " in relciner chair, completed 1x10 AROM ankle DF/PF, AROM right LAQ and hip flexion and AAROM left LAQ and hip flexion. completed 1x10 isometric hip adduction bilaterally. verbal cues for propr completion. discussed current need for assistance and reocmmendation upon discharge.   End of Consult   Patient Position at End of Consult Bedside chair;Bed/Chair alarm activated;All needs within reach       Pt requires PT/OT co-eval due to medical complexity, safety concerns, fall risk, significant assistance with mobility and/or cognitive-behavioral impairments.    (Please find full objective findings from PT assessment regarding body systems outlined above).     Assessment: Pt is a 83 y.o. female seen for PT evaluation s/p admission to [facility] on 3/12/2024 with Closed fracture of left hip with routine healing.  Order placed for PT services.  Upon evaluation: Pt is presenting with impaired functional mobility due to pain, decreased strength, decreased ROM, decreased endurance, impaired balance, gait deviations, impaired cognition, decreased safety awareness, impaired judgment, fall risk, LE edema, and impaired skin integrity requiring  minimal to maximal assistance for bed mobility, moderate of 1 to maximal of 2 assistance for transfers, and maximal of 2 assistance for ambulation with RW . Pt's clinical presentation is currently unpredictable given the functional mobility deficits above, especially weakness, decreased ROM, edema of extremities, decreased skin integrity, decreased endurance, impaired balance, gait deviations, pain, decreased activity tolerance, decreased functional mobility tolerance, decreased safety awareness, impaired judgement, and decreased cognition, coupled with fall risks as indicated by AM-PAC 6-Clicks: 10/24 as well as hx of falls, impaired balance, polypharmacy, impaired judgement, decreased safety awareness, decreased cognition, and obesity and combined with medical  complications of pain impacting overall mobility status, abnormal H&H, abnormal WBCs, abnormal blood sugars, need for input for mobility technique/safety, and closed fracture of left hip s/p repair, delirium, leukemoid reaction .  Pt's PMHx and comorbidities that may affect physical performance and progress include: obesity. Personal factors affecting pt at time of IE include: step(s) to enter environment, limited home support, advanced age, inability to perform IADLs, inability to perform ADLs, inability to navigate level surfaces without external assistance, inability to ambulate household distances, limited insight into impairments, and recent fall(s)/fall history. Pt will benefit from continued skilled PT services to address deficits as defined above and to maximize level of functional mobility to facilitate return toward PLOF and improved QOL. From PT/mobility standpoint, recommendation at time of d/c would be Level I (Maximum Resource Intensity) and with family and/or caregiver support in order to reduce fall risk and maximize pt's functional independence and consistency with mobility. Recommend trial with walker next 1-2 sessions and ther ex next 1-2 sessions.       The patient's AM-PAC Basic Mobility Inpatient Short Form Raw Score is 10. A Raw score of less than or equal to 16 suggests the patient may benefit from discharge to post-acute rehabilitation services. Please also refer to the recommendation of the Physical Therapist for safe discharge planning.       Goals: Pt will: Perform bed mobility tasks with consistent min A of 1 to reposition in bed and prepare for transfers. Pt will perform transfers with consistent min A of 1 to decrease burden of care, decrease risk for falls, and improve activity tolerance and prepare for ambulation. Pt will ambulate with RW for >/= 50' with  consistent modA of 1  to decrease burden of care, decrease risk for falls, improve activity tolerance, and improve gait quality  and to access home environment. Pt will complete 1 step with RW and >/= 3 steps with bilateral handrails with consistent modA of 1 to decrease burden of care, return to home with HA, decrease risk for falls, and improve activity tolerance. Pt will participate in objective balance assessment to determine baseline fall risk. Pt will participate in SSWS assessment to determine level of mobility. Pt will increase B LE strength >/= 1/2 MMT grade to facilitate functional mobility.      Kiesha Ledesma, PT,DPT

## 2024-03-14 NOTE — OCCUPATIONAL THERAPY NOTE
Occupational Therapy Evaluation     Patient Name: Madalyn Dhaliwal  Today's Date: 3/14/2024  Problem List  Principal Problem:    Closed fracture of left hip with routine healing  Active Problems:    Fall from standing    Leukemoid reaction    Obesity, Class I, BMI 30-34.9    Delirium    Past Medical History  History reviewed. No pertinent past medical history.  Past Surgical History  Past Surgical History:   Procedure Laterality Date    MRI BREAST BIOPSY LEFT (ALL INCLUSIVE) Left 4/16/2014    OK OPTX FEM SHFT FX W/INSJ IMED IMPLT W/WO SCREW Left 3/13/2024    Procedure: INSERTION NAIL IM FEMUR ANTEGRADE (TROCHANTERIC);  Surgeon: Prabhu Huber;  Location: [facility];  Service: Orthopedics        03/14/24 0920   Note Type   Note type Evaluation   Pain Assessment   Pain Assessment Tool FLACC   Pain Location/Orientation Orientation: Left;Location: Leg   Pain Rating: FLACC (Rest) - Face 0   Pain Rating: FLACC (Rest) - Legs 0   Pain Rating: FLACC (Rest) - Activity 0   Pain Rating: FLACC (Rest) - Cry 0   Pain Rating: FLACC (Rest) - Consolability 0   Score: FLACC (Rest) 0   Pain Rating: FLACC (Activity) - Face 1   Pain Rating: FLACC (Activity) - Legs 0   Pain Rating: FLACC (Activity) - Activity 0   Pain Rating: FLACC (Activity) - Cry 1   Pain Rating: FLACC (Activity) - Consolability 0   Score: FLACC (Activity) 2   Restrictions/Precautions   Weight Bearing Precautions Per Order Yes   LLE Weight Bearing Per Order WBAT   Other Precautions Cognitive;Chair Alarm;Bed Alarm;Fall Risk;Pain;WBS   Home Living   Type of Home House  (3 HA from front no HR, 1 HA from garage, 3 HA from back no HR)   Home Layout One level;Performs ADLs on one level;Able to live on main level with bedroom/bathroom;Laundry in basement  (R HR going down)   Bathroom Shower/Tub Tub/shower unit   Bathroom Toilet Raised   Bathroom Equipment Grab bars in shower   Home Equipment Cane;Walker   Additional Comments Pt reports living in a 1SH with 1 HA from garage.  Pt primarily uses cane for functional mobility, RW PRN.   Prior Function   Level of [place] Independent with ADLs;Independent with functional mobility;Independent with IADLS   Lives With Spouse   Receives Help From Family   IADLs Independent with driving;Independent with meal prep;Independent with medication management   Falls in the last 6 months 1 to 4   Comments PTA, pt reports independence with ADLs, IADLs, and functional mobility.   General   Family/Caregiver Present Yes  (Spouse and daughter)   ADL   UB Dressing Assistance 5  Supervision/Setup   LB Dressing Assistance 1  Total Assistance   LB Dressing Deficit Don/doff R sock;Don/doff L sock   Additional Comments Pt requiring total assistance to don/doff B socks while seated EOB. Overall, LB ADLs with max/total assist due to pain and cognitive status. UB ADLs with setup, cues, and increased time.   Bed Mobility   Supine to Sit 2  Maximal assistance   Additional items Assist x 1;Increased time required;Verbal cues;LE management;Other;Comment  (Trunk management)   Additional Comments Pt received supine in bed upon start of session. Pt supine > sit EOB with max assist x 1 for LE management and trunk management.   Transfers   Sit to Stand 3  Moderate assistance   Additional items Assist x 1;Increased time required;Verbal cues   Stand to Sit 3  Moderate assistance   Additional items Assist x 1;Increased time required;Verbal cues   Stand pivot 2  Maximal assistance   Additional items Assist x 2;Increased time required;Verbal cues   Additional Comments All functional transfers with RW. Pt sit <> stand with mod assist x 1. Pt spt from bed to recliner chair with max assist x 2, VCs for sequencing and safe transfer techniques.   Functional Mobility   Functional Mobility 2  Maximal assistance  (Assist x 2)   Additional Comments Pt able to take three steps forward with max assist x 2 and RW.   Balance   Static Sitting Fair   Dynamic Sitting Fair -   Static Standing  Poor +   Dynamic Standing Poor   Activity Tolerance   Activity Tolerance Patient limited by fatigue;Patient limited by pain;Other (Comment)  (Pt limited by cognitive status)   Medical Staff Made Aware Kiesha DOYLE   Nurse Made Aware RNRacheal Assessment   RUE Assessment WFL  (Strength grossly 3+/5)   LUE Assessment   LUE Assessment WFL  (Strength grossly 3+/5)   Hand Function   Gross Motor Coordination Functional   Fine Motor Coordination Functional   Vision-Basic Assessment   Current Vision Wears glasses all the time   Cognition   Overall Cognitive Status Impaired   Arousal/Participation Alert;Cooperative   Attention Within functional limits   Orientation Level Oriented to person;Disoriented to place;Disoriented to situation;Oriented to time  (Able to explain situation with cues. Oriented to month, disoriented to year-later stating 2024 with cues.)   Following Commands Follows one step commands with increased time or repetition   Assessment   Limitation Decreased ADL status;Decreased UE strength;Decreased Safe judgement during ADL;Decreased cognition;Decreased endurance;Decreased self-care trans;Decreased high-level ADLs   Prognosis Good   Assessment Pt is a 83 y.o. female, admitted to [facility] 3/12/2024 d/t experiencing fall. Dx: Closed fracture of L hip with routine healing. Pt with PMHx impacting their performance during ADL tasks, including: leukemoid reaction, fall from standing. Prior to admission to the hospital Pt was performing ADLs without physical assistance. IADLs without physical assistance. Functional transfers/ambulation without physical assistance. Cognitive status PTA was WFL. OT order placed to assess Pt's ADLs, cognitive status, and performance during functional tasks in order to maximize safety and independence while making most appropriate d/c recommendations. PT/OT co-evaluation completed at this time d/t significant mobility deficits and safety concerns. Pt's clinical presentation is currently  unstable/unpredictable given new onset deficits that affect Pt's occupational performance and ability to safely return to PLOF including decrease activity tolerance, decrease standing balance, decrease performance during ADL tasks, decrease safety awareness , decrease UB MS, increased pain, decrease generalized strength, decrease activity engagement, decrease performance during functional transfers, steps to enter home, limited family support, high fall risk, and limited insight to deficits combined with medical complications of pain impacting overall mobility status, abnormal H&H, abnormal CBC, wounds, decreased skin integrity, fear/retreat, and need for input for mobility technique/safety. Personal factors affecting Pt at time of initial evaluation include: step(s) to enter environment, limited home support, advanced age, inability to perform IADLs, inability to perform ADLs, inability to ambulate household distances, inability to navigate community distances, limited insight into impairments, decreased initiation and engagement, and recent fall(s)/fall history. Pt will benefit from continued skilled OT services to address deficits as defined above and to maximize level independence/participation during ADLs and functional tasks to facilitate return toward PLOF and improved quality of life. From an occupational therapy standpoint, Level I (Maximum Resource Intensity) is recommended upon d/c.   Plan   Treatment Interventions ADL retraining;Functional transfer training;UE strengthening/ROM;Endurance training;Cognitive reorientation;Patient/family training;Compensatory technique education;Continued evaluation;Energy conservation;Activityengagement;Equipment evaluation/education   Goal Expiration Date 03/28/24   OT Treatment Day 1   OT Frequency 3-5x/wk   Discharge Recommendation   Rehab Resource Intensity Level, OT I (Maximum Resource Intensity)   AM-PAC Daily Activity Inpatient   Lower Body Dressing 1   Bathing 2    Toileting 2   Upper Body Dressing 3   Grooming 3   Eating 4   Daily Activity Raw Score 15   Daily Activity Standardized Score (Calc for Raw Score >=11) 34.69   AM-PAC Applied Cognition Inpatient   Following a Speech/Presentation 2   Understanding Ordinary Conversation 3   Taking Medications 2   Remembering Where Things Are Placed or Put Away 2   Remembering List of 4-5 Errands 1   Taking Care of Complicated Tasks 1   Applied Cognition Raw Score 11   Applied Cognition Standardized Score 27.03   Additional Treatment Session   Start Time 0957   End Time 1005   Treatment Assessment Pt participated in ADLs tasks in seated. Pt able to brush her teeth and comb her hair with setup. Pt tolerated treatment well. Functional mobility/transfers were limited today due to fatigue and pain. She will benefit from continued skilled OT services to maximize independence in functional mobility and functional transfers. At end of session, pt seated in chair with chair alarm on, call bell in reach, and all needs met.        03/14/24 0932 03/14/24 0936 03/14/24 0950   Vitals   Blood Pressure 130/55 139/63 102/57   Patient Position - Orthostatic VS Lying - Orthostatic VS Sitting - Orthostatic VS   (Sitting in chair, after pivot from bed to chair)      03/14/24 0952   Vitals   Blood Pressure 105/59   Patient Position - Orthostatic VS Sitting         The patient's raw score on the AM-PAC Daily Activity Inpatient Short Form is 15. A raw score of less than 19 suggests the patient may benefit from discharge to post-acute rehabilitation services. Please refer to the recommendation of the Occupational Therapist for safe discharge planning.    Pt goals to be met by 3/28/2024:    Pt will demonstrate ability to complete grooming/hygiene tasks @ mod I after set-up.  Pt will demonstrate ability to complete supine<>sit @ mod I in order to increase safety and independence during ADL tasks.  Pt will demonstrate ability to complete UB ADLs including  washing/dressing @ mod I in order to increase performance and participation during meaningful tasks  Pt will demonstrate ability to complete LB dressing @ mod I in order to increase safety and independence during meaningful tasks.   Pt will demonstrate ability to aida/doff socks/shoes while sitting EOB/chair @ mod I in order to increase safety and independence during meaningful tasks.   Pt will demonstrate ability to complete toileting tasks including CM and pericare @ mod I in order to increase safety and independence during meaningful tasks.  Pt will demonstrate ability to complete EOB, chair, toilet/commode transfers @ mod I in order to increase performance and participation during functional tasks.  Pt will demonstrate ability to stand for 10 minutes while maintaining F+ balance with use of RW for UB support PRN.  Pt will demonstrate ability to tolerate 20 minute OT session with no vc'ing for deep breathing or use of energy conservation techniques in order to increase activity tolerance during functional tasks.   Pt will demonstrate Good carryover of use of energy conservation/compensatory strategies during ADLs and functional tasks in order to increase safety and reduce risk for falls.   Pt will demonstrate Good attention and participation in continued evaluation of functional ambulation house hold distances in order to assist with safe d/c planning.  Pt will attend to continued cognitive assessments 100% of the time in order to provide most appropriate d/c recommendations.   Pt will follow 100% simple 2-step commands and be A&O x4 consistently with environmental cues to increase participation in functional activities.   Pt will identify 3 areas of interest/hobbies and 1 intervention on how to incorporate into daily life in order to increase interaction with environment and peers as well as increase participation in meaningful tasks.   Pt will demonstrate 100% carryover of BUE HEP in order to increase BUE MS  and increase performance during functional tasks upon d/c home.    Chet Arriaga MS, OTR/L

## 2024-03-14 NOTE — PLAN OF CARE
Problem: PHYSICAL THERAPY ADULT  Goal: Performs mobility at highest level of function for planned discharge setting.  See evaluation for individualized goals.  Description: Treatment/Interventions: ADL retraining, Functional transfer training, LE strengthening/ROM, Elevations, Therapeutic exercise, Endurance training, Patient/family training, Equipment eval/education, Bed mobility, Gait training, Cognitive reorientation, Compensatory technique education, Spoke to nursing, Spoke to case management, OT          See flowsheet documentation for full assessment, interventions and recommendations.  3/14/2024 1454 by Kiesha Ledesma, PT  Note: Prognosis: Good  Problem List: Decreased strength, Decreased endurance, Impaired balance, Decreased range of motion, Decreased mobility, Decreased cognition, Impaired judgement, Decreased safety awareness, Obesity, Decreased skin integrity, Pain  Pt tolerated session fairly. She requires min cues for proper completion of LE TE. She requires assistance for full completion left LE. Continue PT services to maximize LOF.  Barriers to Discharge: Decreased caregiver support, Inaccessible home environment  Barriers to Discharge Comments: requires increased assistance to complete mobility, increased fall risk, impaired cognition post op per daughter patient is confused at times, but not currently at baseline.  Rehab Resource Intensity Level, PT: I (Maximum Resource Intensity)    See flowsheet documentation for full assessment.

## 2024-03-14 NOTE — OP NOTE
OPERATIVE REPORT  PATIENT NAME: Madalyn Dhaliwal    :  1940  MRN: [id number]  Pt Location:  OR ROOM 02    SURGERY DATE: 3/13/2024    Surgeons and Role:     * Prabhu Huber - Primary     * Rell Arndt PA-C - Assisting    Preop Diagnosis:  Closed fracture of left hip, initial encounter ([facility]) [S72.002A]    Post-Op Diagnosis Codes:     * Closed fracture of left hip, initial encounter ([facility]) [S72.002A]    Procedure(s):  Left - INSERTION NAIL IM FEMUR ANTEGRADE (TROCHANTERIC)    Fluids: 650 cc    Estimated Blood Loss:   150 mL    Drains:  External Urinary Catheter (Active)   Collection Container Canister and suction tubing (For Female) 24 170   Suction Pressure (mmHg) 100 mmHg 24 1701   Interventions Removed and skin assessed;Pericare performed;Device changed;Suction canister changed;Suction tubing changed 24 1701   Output (mL) 400 mL 24 1701   Number of days: 1       Anesthesia Type:   General with supplemental local utilizing 30 cc of 0.5% Marcaine without epinephrine    Operative Indications:  Closed fracture of left hip, initial encounter ([facility]) [S72.002A]  Madalyn is an 83-year-old female who fell injuring her left hip on 3/12/2024.  She was seen in the emergency room and subsequently admitted to the hospital with orthopedic consultation requested.  The patient history was obtained by myself, physical exam performed and the risk, benefits, options and alternatives of treatment discussed with the patient and her  as well as daughter.  It was elected to proceed with surgical fixation.    Operative Findings:  At the time of the procedure, the patient's left intertrochanteric hip fracture was stably fixated utilizing the [employer] TFNA system with a 12 mm diameter 130 degree neck angle nail with 100 mm compression screw and 36 mm distal locking screw.  Fluoroscopic images in AP and lateral planes demonstrated excellent fracture alignment and stable fixation.    Complications:    None    Procedure and Technique:  Madalyn was taken to the operating room and administered a general anesthetic.  She was placed on the fracture table with care taken to protect neurovascular structures and bony prominences. The right lower extremity was abducted, flexed and externally rotated to allow for fluoroscopic visualization of the affected hip. The left was placed in traction and the fracture reduced. Fluoroscopic images were obtained. Adjustments in the traction and alignment were performed to allow for adequate fracture alignment.  An incision was made in line with the femoral shaft extending from the greater trochanter for approximately 10 cm. Sharp dissection was carried down through the skin and subcutaneous tissues and bleeding controlled with cautery. Soft tissues were then dissected down to the fascia which was divided in line with the incision. The greater trochanter was then palpated, a Pollard elevator used to expose the greater trochanter and then the proximal femoral canal accessed in standard fashion utilizing an awl with care taken to protect proper placement on both AP and lateral views.  A 12 mm diameter, 130 degree neck angle nail was then inserted in standard fashion and advanced under fluoroscopic guidance.  Utilizing fluoroscopic guidance an incision was made over the lateral aspect of the proximal thigh for placement of the compression screw. Sharp dissection was carried down through the skin and subcutaneous tissues and bleeding controlled with cautery. Tissues were dissected down to the fascia which was then divided. The guide for placement of the guide pin was positioned, advanced to the lateral cortex and a guide pin inserted to subchondral bone of the femoral head taking care to ensure proper position on both AP and lateral images. The lateral cortex was then drilled after a depth gauge was utilized to measure for the proper length screw. The 100 mm compression screw was then placed  and advanced to the subchondral bone under direct visualization. The proximal screw was advanced to secure the compression screw in the nail. The fracture was compressed as needed.  The guide for placement of the distal locking screw was positioned against the cortex, a drill placed and a 6 mm screw then inserted in standard fashion.  Fluoroscopic images were obtained demonstrating the fracture to be adequately aligned and stably fixated. Wounds were irrigated with saline solution. The fascia was approximated with 1. Vicryl suture.  The subcutaneous tissues approximated with 2-0 Vicryl and the skin approximated with staples. Sterile dressings were then applied consisting of Mepilex dressings  The patient was then taken from the fracture table and transferred to the ICU for recovery in stable and satisfactory postop condition.   I was present for the entire procedure., A qualified resident physician was not available., and A physician assistant was required during the procedure for retraction, tissue handling, dissection and suturing.    Patient Disposition:  Critical Care Unit        SIGNATURE: Prabhu Huber  DATE: March 13, 2024  TIME: 8:23 PM

## 2024-03-14 NOTE — QUICK NOTE
Consent obtained for exposure panel, over the phone from patient's spouse, since patient found confused.  Witnessed by nurse.   Consent form left in the patient's chart.

## 2024-03-14 NOTE — PLAN OF CARE
Problem: OCCUPATIONAL THERAPY ADULT  Goal: Performs self-care activities at highest level of function for planned discharge setting.  See evaluation for individualized goals.  Description: Treatment Interventions: ADL retraining, Functional transfer training, UE strengthening/ROM, Endurance training, Cognitive reorientation, Patient/family training, Compensatory technique education, Continued evaluation, Energy conservation, Activityengagement, Equipment evaluation/education          See flowsheet documentation for full assessment, interventions and recommendations.   Outcome: Progressing  Note: Limitation: Decreased ADL status, Decreased UE strength, Decreased Safe judgement during ADL, Decreased cognition, Decreased endurance, Decreased self-care trans, Decreased high-level ADLs  Prognosis: Good  Assessment: Pt is a 83 y.o. female, admitted to [facility] 3/12/2024 d/t experiencing fall. Dx: Closed fracture of L hip with routine healing. Pt with PMHx impacting their performance during ADL tasks, including: leukemoid reaction, fall from standing. Prior to admission to the hospital Pt was performing ADLs without physical assistance. IADLs without physical assistance. Functional transfers/ambulation without physical assistance. Cognitive status PTA was WFL. OT order placed to assess Pt's ADLs, cognitive status, and performance during functional tasks in order to maximize safety and independence while making most appropriate d/c recommendations. PT/OT co-evaluation completed at this time d/t significant mobility deficits and safety concerns. Pt's clinical presentation is currently unstable/unpredictable given new onset deficits that affect Pt's occupational performance and ability to safely return to PLOF including decrease activity tolerance, decrease standing balance, decrease performance during ADL tasks, decrease safety awareness , decrease UB MS, increased pain, decrease generalized strength, decrease activity  engagement, decrease performance during functional transfers, steps to enter home, limited family support, high fall risk, and limited insight to deficits combined with medical complications of pain impacting overall mobility status, abnormal H&H, abnormal CBC, wounds, decreased skin integrity, fear/retreat, and need for input for mobility technique/safety. Personal factors affecting Pt at time of initial evaluation include: step(s) to enter environment, limited home support, advanced age, inability to perform IADLs, inability to perform ADLs, inability to ambulate household distances, inability to navigate community distances, limited insight into impairments, decreased initiation and engagement, and recent fall(s)/fall history. Pt will benefit from continued skilled OT services to address deficits as defined above and to maximize level independence/participation during ADLs and functional tasks to facilitate return toward PLOF and improved quality of life. From an occupational therapy standpoint, Level I (Maximum Resource Intensity) is recommended upon d/c.     Rehab Resource Intensity Level, OT: I (Maximum Resource Intensity)

## 2024-03-14 NOTE — CASE MANAGEMENT
Case Management Discharge Planning Note    Patient name Madalyn Dhaliwal  Location /-01 MRN [id number]  : 1940 Date 3/14/2024       Current Admission Date: 3/12/2024  Current Admission Diagnosis:Closed fracture of left hip with routine healing   Patient Active Problem List    Diagnosis Date Noted    Delirium 2024    Obesity, Class I, BMI 30-34.9 2024    Closed fracture of left hip with routine healing 2024    Fall from standing 2024    Leukemoid reaction 2024      LOS (days): 1  Geometric Mean LOS (GMLOS) (days): 2.8  Days to GMLOS:1.6     OBJECTIVE:  Risk of Unplanned Readmission Score: 9.85         Current admission status: Inpatient   Preferred Pharmacy:   [facility] [place] [address]  Phone: [phone] Fax: [phone]    Primary Care Provider: No primary care provider on file.    Primary Insurance: [employer] RE[employer]  Secondary Insurance:     DISCHARGE DETAILS:     CM submitted [facility] referral for STR. CM included [facility] in referral as first choice from family. CM to follow for acceptance and review options with family.

## 2024-03-14 NOTE — PROGRESS NOTES
"Orthopedics   Madalyn Dhaliwal 83 y.o. female MRN: [id number]  Unit/Bed#: -01    Subjective:  83 y.o.female post operative day 1 left short TFN. Pt doing well was removed from wrist restraints.  She is sitting in bed feeding herself she is conversant and pleasant. Pain controlled.    Labs:  0   Lab Value Date/Time    HCT 30.5 (L) 03/14/2024 0549    HCT 36.0 03/13/2024 0454    HCT 42.2 03/12/2024 1941    HGB 9.9 (L) 03/14/2024 0549    HGB 11.4 (L) 03/13/2024 0454    HGB 13.1 03/12/2024 1941    INR 1.01 03/12/2024 1941    WBC 14.11 (H) 03/14/2024 0549    WBC 10.04 03/13/2024 0454    WBC 14.66 (H) 03/12/2024 1941     Meds:    Current Facility-Administered Medications:     ceFAZolin (ANCEF) IVPB (premix in dextrose) 2,000 mg 50 mL, 2,000 mg, Intravenous, Q8H, Prabhu Huber, Stopped at 03/14/24 0047    docusate sodium (COLACE) capsule 100 mg, 100 mg, Oral, BID, Prabhu Diverio, 100 mg at 03/13/24 0821    enoxaparin (LOVENOX) subcutaneous injection 40 mg, 40 mg, Subcutaneous, Daily, Prabhu Huber    lactated ringers bolus 1,000 mL, 1,000 mL, Intravenous, Once PRN **AND** lactated ringers bolus 1,000 mL, 1,000 mL, Intravenous, Once PRN, Prabhu Huber    ondansetron (ZOFRAN) injection 4 mg, 4 mg, Intravenous, Q6H PRN, Prabhu Huber    oxyCODONE (ROXICODONE) immediate release tablet 10 mg, 10 mg, Oral, Q6H PRN, Prabhu Diverio    oxyCODONE (ROXICODONE) IR tablet 5 mg, 5 mg, Oral, Q6H PRN, Prbahu Divermarybel    sodium chloride 0.9 % bolus 1,000 mL, 1,000 mL, Intravenous, Once PRN **AND** sodium chloride 0.9 % bolus 1,000 mL, 1,000 mL, Intravenous, Once PRN, Prabhu Huber    Blood Culture:   No results found for: \"BLOODCX\"    Wound Culture:   No results found for: \"WOUNDCULT\"    Ins and Outs:  I/O last 24 hours:  In: 2460 [I.V.:2310; IV Piggyback:150]  Out: 1120 [Urine:970; Blood:150]    Physical exam:  Vitals:    03/14/24 0718   BP: 154/70   Pulse: 94   Resp: 18   Temp: 98.3 °F (36.8 °C)   SpO2: 93%     left lower " extremity  Distal Mepilex dressing clean dry intact.  Proximal Mepilex dressing has bloody saturation approximately one third of the bandage.  Sensation intact L2-S1.  Patient following commands and moving toes and ankles dorsiflex and plantar flexing.  Minimal pain with logrolling of the left hip.  Thigh is soft minimal incisional tenderness as expected.  No calf pain  Motor intact to knee flexion/extension, EHL/FHL  2+ DP and PT pulses 2/4    Assessment: 83 y.o.female post operative day 1 left femur short TFN. Pt doing well    Plan:  Up and out of bed weightbearing as tolerated left lower extremity with walker  PT/OT  DVT prophylaxis per primary service  Analgesics  Dispo: Ortho will follow  Will continue to assess for acute blood loss anemia    Rell Arndt PA-C

## 2024-03-14 NOTE — ASSESSMENT & PLAN NOTE
Post op and hospital delirium   Family reports intermittent confusion at baseline   Reorient as needed

## 2024-03-14 NOTE — ASSESSMENT & PLAN NOTE
Likely secondary to post op  No signs of infection, remains afebrile  Plain film chest x-ray unrevealing  UA normal  Monitor daily cbc

## 2024-03-15 VITALS
RESPIRATION RATE: 18 BRPM | HEIGHT: 60 IN | TEMPERATURE: 98.6 F | WEIGHT: 186.51 LBS | OXYGEN SATURATION: 94 % | SYSTOLIC BLOOD PRESSURE: 123 MMHG | BODY MASS INDEX: 36.62 KG/M2 | DIASTOLIC BLOOD PRESSURE: 76 MMHG | HEART RATE: 89 BPM

## 2024-03-15 PROBLEM — D62 ACUTE BLOOD LOSS ANEMIA: Status: ACTIVE | Noted: 2024-03-15

## 2024-03-15 PROBLEM — G92.9 TOXIC ENCEPHALOPATHY: Status: ACTIVE | Noted: 2024-03-14

## 2024-03-15 LAB
ERYTHROCYTE [DISTWIDTH] IN BLOOD BY AUTOMATED COUNT: 13.1 % (ref 11.6–15.1)
HCT VFR BLD AUTO: 29.7 % (ref 34.8–46.1)
HGB BLD-MCNC: 9.4 G/DL (ref 11.5–15.4)
MCH RBC QN AUTO: 30.7 PG (ref 26.8–34.3)
MCHC RBC AUTO-ENTMCNC: 31.6 G/DL (ref 31.4–37.4)
MCV RBC AUTO: 97 FL (ref 82–98)
PLATELET # BLD AUTO: 279 THOUSANDS/UL (ref 149–390)
PMV BLD AUTO: 9.3 FL (ref 8.9–12.7)
RBC # BLD AUTO: 3.06 MILLION/UL (ref 3.81–5.12)
WBC # BLD AUTO: 13.98 THOUSAND/UL (ref 4.31–10.16)

## 2024-03-15 PROCEDURE — 97535 SELF CARE MNGMENT TRAINING: CPT

## 2024-03-15 PROCEDURE — 85027 COMPLETE CBC AUTOMATED: CPT

## 2024-03-15 PROCEDURE — 97530 THERAPEUTIC ACTIVITIES: CPT

## 2024-03-15 PROCEDURE — 97110 THERAPEUTIC EXERCISES: CPT

## 2024-03-15 PROCEDURE — 99239 HOSP IP/OBS DSCHRG MGMT >30: CPT

## 2024-03-15 PROCEDURE — 99024 POSTOP FOLLOW-UP VISIT: CPT | Performed by: PHYSICIAN ASSISTANT

## 2024-03-15 PROCEDURE — 97116 GAIT TRAINING THERAPY: CPT

## 2024-03-15 RX ORDER — ACETAMINOPHEN 500 MG
500 TABLET ORAL EVERY 6 HOURS PRN
Status: ON HOLD
Start: 2024-03-15

## 2024-03-15 RX ORDER — ENOXAPARIN SODIUM 100 MG/ML
40 INJECTION SUBCUTANEOUS DAILY
Status: ON HOLD
Start: 2024-03-16 | End: 2024-04-05

## 2024-03-15 RX ORDER — DOCUSATE SODIUM 100 MG/1
100 CAPSULE, LIQUID FILLED ORAL 2 TIMES DAILY
Status: ON HOLD
Start: 2024-03-15

## 2024-03-15 RX ADMIN — ENOXAPARIN SODIUM 40 MG: 40 INJECTION SUBCUTANEOUS at 09:37

## 2024-03-15 NOTE — PROGRESS NOTES
"Orthopedics   Madalyn Dhaliwal 83 y.o. female MRN: [id number]  Unit/Bed#: -01      Subjective:  83 y.o.female post operative day 2 left femoral short TFN.   Pt doing well. Pain controlled.  Patient sleeping comfortably.    Labs:  0   Lab Value Date/Time    HCT 29.7 (L) 03/15/2024 0517    HCT 30.5 (L) 03/14/2024 0549    HCT 36.0 03/13/2024 0454    HGB 9.4 (L) 03/15/2024 0517    HGB 9.9 (L) 03/14/2024 0549    HGB 11.4 (L) 03/13/2024 0454    INR 1.01 03/12/2024 1941    WBC 13.98 (H) 03/15/2024 0517    WBC 14.11 (H) 03/14/2024 0549    WBC 10.04 03/13/2024 0454     Meds:    Current Facility-Administered Medications:     docusate sodium (COLACE) capsule 100 mg, 100 mg, Oral, BID, Prabhu Diverio, 100 mg at 03/14/24 1705    enoxaparin (LOVENOX) subcutaneous injection 40 mg, 40 mg, Subcutaneous, Daily, Prabhu Diverio, 40 mg at 03/14/24 0913    ondansetron (ZOFRAN) injection 4 mg, 4 mg, Intravenous, Q6H PRN, Prabhu Diverio    oxyCODONE (ROXICODONE) immediate release tablet 10 mg, 10 mg, Oral, Q6H PRN, Prabhu Diverio    oxyCODONE (ROXICODONE) IR tablet 5 mg, 5 mg, Oral, Q6H PRN, Prabhu Diverio    Blood Culture:   No results found for: \"BLOODCX\"    Wound Culture:   No results found for: \"WOUNDCULT\"    Ins and Outs:  I/O last 24 hours:  In: 760 [P.O.:600; I.V.:10; IV Piggyback:150]  Out: 100 [Urine:100]    Physical exam:  Vitals:    03/15/24 0737   BP: 112/72   Pulse: 85   Resp: 17   Temp: 98.2 °F (36.8 °C)   SpO2: 91%     left lower extremity  Distal Mepilex dressing clean dry intact.  Proximal Mepilex dressing notes bloody saturation with ABD reinforcement.  Sensation intact L2-S1  Motor intact to knee flexion/extension, EHL/FHL  2+ DP pulse    Assessment: 83 y.o.female post operative day 2 left femur short TFN. Pt doing well.    Plan:  Up and out of bed WBAT LLE  with walker  Weightbearing as tolerated left lower extremity  Will have nursing do a proximal wound dressing change later today when the patient is awake.  The " proximal Mepilex dressing should be removed the incision cleansed with 3 alcohol pads and a new Mepilex dressing placed with compressive Ace wrap over top.  PT/OT  DVT prophylaxis and analgesics per primary service  Dispo: Ortho will follow  Will continue to assess for acute blood loss anemia    Rell Arndt PA-C

## 2024-03-15 NOTE — CASE MANAGEMENT
[facility] has received APPROVED authorization.  Insurance:   [employer]  Called in by Rep: Katy DALAL#  [phone]  Authorization received for: [facility]  Facility: [facility]   Authorization #: [id number]   Start of Care: 3/15  Next Review Date: 3/20  Continued Stay Care Coordinator:Francia DALAL#: [phone]  Submit next review to: [phone]     Care Manager notified: Mellisa Coker

## 2024-03-15 NOTE — ASSESSMENT & PLAN NOTE
Post op and hospital delirium - likely component from anesthesia   Aeb increased confusion and disorientation   Family reports intermittent confusion at baseline   Reorient as needed  Resolved

## 2024-03-15 NOTE — PROGRESS NOTES
Patient:    MRN:  [id number]    Aidin Request ID:  [id number]    Level of care reserved:  Skilled Nursing Facility    Partner Reserved:  [place] [address] [phone]    Clinical needs requested:    Geography searched:  20 miles around [address]    Start of Service:    Request sent:  2:56pm EDT on 3/14/2024 by Georgia Acuna    Partner reserved:  8:34am EDT on 3/15/2024 by Mellisa Coker    Choice list shared:  8:06am EDT on 3/15/2024 by Mellisa Coker

## 2024-03-15 NOTE — PLAN OF CARE
Problem: OCCUPATIONAL THERAPY ADULT  Goal: Performs self-care activities at highest level of function for planned discharge setting.  See evaluation for individualized goals.  Description: Treatment Interventions: ADL retraining, Functional transfer training, UE strengthening/ROM, Endurance training, Cognitive reorientation, Patient/family training, Compensatory technique education, Continued evaluation, Energy conservation, Activityengagement, Equipment evaluation/education          See flowsheet documentation for full assessment, interventions and recommendations.   Outcome: Progressing  Note: Limitation: Decreased ADL status, Decreased UE strength, Decreased Safe judgement during ADL, Decreased cognition, Decreased endurance, Decreased self-care trans, Decreased high-level ADLs  Prognosis: Good  Assessment: Pt completed OT tx session #2 focused on ADL performance and functional mobility. Pt alert and agreeable to participate. PT/OT co-treat completed d/t significant mobility deficits and safety concerns. Pt demonstrated slight improvements in cognition since evaluation but required increased assistance for all ADL tasks and transfers. Pt currently completing UB ADLs @ Min A for task initiation, LB ADLs @ Total A, and functional mobility with RW @ Max A. Pt demonstrating Good participation and Good potential to achieve goals but is currently demonstrating deficits in decrease activity tolerance, decrease standing balance, decrease performance during ADL tasks, decrease cognition, decrease UB MS, decrease generalized strength, decrease activity engagement, and decrease performance during functional transfers. Continue to recommend Level I: Maximum Resource Intensity Therapy upon discharge to increase safety and independence in ADL tasks and functional mobility.     Rehab Resource Intensity Level, OT: I (Maximum Resource Intensity)

## 2024-03-15 NOTE — ASSESSMENT & PLAN NOTE
Patient presents this evening after sustaining mechanical fall from standing. Patient ambulating when hit with high morris of wind making her lose her balance and fell upon left hip, denies striking head. Patient without complaints of loss of consciousness, lightheadedness, dizziness, shaking behavior, urinary or bladder incontinence  with pain and inability to ambulate after the event  xray revealed left femoral neck fracture, nondisplaced, noncomminuted  Orthopedics consulted - had surgical fixation 3/13. Will need DVT prophylaxis for 3 weeks post op and follow up outpatient in 2 weeks  Index of suspicion for purely mechanical fall from standing  Pain management  PT/OT post op - recommend rehab

## 2024-03-15 NOTE — CASE MANAGEMENT
Case Management Progress Note    Patient name Madalyn Dhaliwal  Location /-01 MRN [id number]  : 1940 Date 3/15/2024       LOS (days): 2  Geometric Mean LOS (GMLOS) (days): 2.8  Days to GMLOS:0.8        OBJECTIVE:        Current admission status: Inpatient  Preferred Pharmacy:   [facility] [address] [address]  Phone: [phone] Fax: [phone]    Primary Care Provider: No primary care provider on file.    Primary Insurance: [employer] REP  Secondary Insurance:     PROGRESS NOTE:    Day 3 of hospital stay, this writer assuming care for dc planning.    Patient needing SNF for short term rehab.  Referrals sent with  notes endorsing [facility] as first choice of facility.     [facility] has offered a bed, is able to take patient once insurance auth received - have weekend availability to admit.   Auth requested via  DC support protocol this morning.  PENDING    CM call to spouse Brigido at [phone] to review and confirm dc plan.  Brigido endorses dc to SNF with [facility] as preference.    IMM reviewed with patient's caregiver, patient's caregiver agrees with discharge determination.     DCP: [facility], auth requested and pending.   Anticipate weekend admission.  No COVID test needed.

## 2024-03-15 NOTE — CASE MANAGEMENT
Case Management Discharge Planning Note    Patient name Madalyn Dhaliwal  Location /-01 MRN [id number]  : 1940 Date 3/15/2024       Current Admission Date: 3/12/2024  Current Admission Diagnosis:Closed fracture of left hip with routine healing   Patient Active Problem List    Diagnosis Date Noted    Delirium 2024    Obesity, Class I, BMI 30-34.9 2024    Closed fracture of left hip with routine healing 2024    Fall from standing 2024    Leukemoid reaction 2024      LOS (days): 2  Geometric Mean LOS (GMLOS) (days): 2.8  Days to GMLOS:0.6     OBJECTIVE:  Risk of Unplanned Readmission Score: 9.79         Current admission status: Inpatient   Preferred Pharmacy:   [facility] [address]   Phone: [phone] Fax: [phone]    Primary Care Provider: No primary care provider on file.    Primary Insurance: [employer] REP  Secondary Insurance:     DISCHARGE DETAILS:                                                                                                               Facility Insurance Auth Number: [id number]

## 2024-03-15 NOTE — NURSING NOTE
Report called to Emily on MS2.  Pt transferred to MS bed and transported to MS2 with 2 PCAs and all belongings.  Pt's daughter, Carissa, notified of transfer and new bed number.

## 2024-03-15 NOTE — ASSESSMENT & PLAN NOTE
Secondary to surgery on left femur fracture   Monitor labs on discharge   Cbc in one week after discharge

## 2024-03-15 NOTE — ASSESSMENT & PLAN NOTE
Likely secondary to post op  No signs of infection, remains afebrile  Plain film chest x-ray unrevealing  UA normal  CBC is to be done 5 days after discharge

## 2024-03-15 NOTE — DISCHARGE INSTR - AVS FIRST PAGE
Discharge Instructions - Orthopedics  Madalyn Dhaliwal 83 y.o. female MRN: [id number]  Unit/Bed#: -01    Weight Bearing Status:                                           Weight bearing as tolerated left lower extremity.     DVT prophylaxis:  Lovenox daily x 3 weeks     Pain:  Continue analgesics as directed    Dressing Instructions:   You may remove your surgical dressing in 3 days (72 hours).   After this has been removed, you may leave incision open to air or cover it with a light gauze dressing.     Showering:   After your surgical dressing has been removed, you may shower and allow water to run over your incision.   Do not submerge incision for anything length of time. (No tubs, pools, hot tubs, etc)    Appt Instructions:   If you do not have your appointment, please call the clinic at [phone] to f/u with Dr. Huber within 2 weeks of discharge.     Contact the office sooner if you experience any increased numbness/tingling in the extremities or severe pain that does not respond to pain medication.

## 2024-03-15 NOTE — CASE MANAGEMENT
[facility] received request for authorization from Care Manager.  Authorization request for: SNF  [id number]  Facility MD: MD Yulissa Grant NPI: [id number]   Authorization initiated by contacting insurance: [employer]  Via: [employer]   Clinicals submitted via: [employer] attachment   Pending Reference #: [id number]    Care Manager notified: Mellisa PERKINS

## 2024-03-15 NOTE — PLAN OF CARE
Problem: PAIN - ADULT  Goal: Verbalizes/displays adequate comfort level or baseline comfort level  Description: Interventions:  - Encourage patient to monitor pain and request assistance  - Assess pain using appropriate pain scale  - Administer analgesics based on type and severity of pain and evaluate response  - Implement non-pharmacological measures as appropriate and evaluate response  - Consider cultural and social influences on pain and pain management  - Notify physician/advanced practitioner if interventions unsuccessful or patient reports new pain  Outcome: Progressing     Problem: SAFETY ADULT  Goal: Patient will remain free of falls  Description: INTERVENTIONS:  - Educate patient/family on patient safety including physical limitations  - Instruct patient to call for assistance with activity   - Consult OT/PT to assist with strengthening/mobility   - Keep Call bell within reach  - Keep bed low and locked with side rails adjusted as appropriate  - Keep care items and personal belongings within reach  - Initiate and maintain comfort rounds  - Make Fall Risk Sign visible to staff  - Offer Toileting every 2 Hours, in advance of need if unable to notify staff  - Initiate/Maintain bed/chair alarm for confusion, impulsivity, or noncompliance with notifying staff  - Apply yellow socks and bracelet for high fall risk patients  - Consider moving patient to room near nurses station  Outcome: Progressing  Goal: Maintain or return to baseline ADL function  Description: INTERVENTIONS:  -  Assess patient's ability to carry out ADLs; assess patient's baseline for ADL function and identify physical deficits which impact ability to perform ADLs (bathing, care of mouth/teeth, toileting, grooming, dressing, etc.)  - Assess/evaluate cause of self-care deficits   - Assess range of motion  - Assess patient's mobility; develop plan if impaired  - Assess patient's need for assistive devices and provide as appropriate  -  Encourage maximum independence but intervene and supervise when necessary  - Involve family in performance of ADLs  - Assess for home care needs following discharge   - Consider OT consult to assist with ADL evaluation and planning for discharge  - Provide patient education as appropriate  Outcome: Progressing  Goal: Maintains/Returns to pre admission functional level  Description: INTERVENTIONS:  - Perform AM-PAC 6 Click Basic Mobility/ Daily Activity assessment daily.  - Set and communicate daily mobility goal to care team and patient/family/caregiver.   - Collaborate with rehabilitation services on mobility goals if consulted  - Perform Range of Motion 3 times a day.  - Reposition patient every 2 hours.  - Out of bed for toileting when medically appropriate  - Record patient progress and toleration of activity level   Outcome: Progressing     Problem: DISCHARGE PLANNING  Goal: Discharge to home or other facility with appropriate resources  Description: INTERVENTIONS:  - Identify barriers to discharge w/patient and caregiver  - Arrange for needed discharge resources and transportation as appropriate  - Identify discharge learning needs (meds, wound care, etc.)  - Arrange for interpretive services to assist at discharge as needed  - Refer to Case Management Department for coordinating discharge planning if the patient needs post-hospital services based on physician/advanced practitioner order or complex needs related to functional status, cognitive ability, or social support system  Outcome: Progressing

## 2024-03-15 NOTE — CASE MANAGEMENT
Case Management Discharge Planning Note    Patient name Madalyn Dhaliwal  Location /-01 MRN [id number]  : 1940 Date 3/15/2024       Current Admission Date: 3/12/2024  Current Admission Diagnosis:Closed fracture of left hip with routine healing   Patient Active Problem List    Diagnosis Date Noted    Delirium 2024    Obesity, Class I, BMI 30-34.9 2024    Closed fracture of left hip with routine healing 2024    Fall from standing 2024    Leukemoid reaction 2024      LOS (days): 2  Geometric Mean LOS (GMLOS) (days): 2.8  Days to GMLOS:0.7     OBJECTIVE:  Risk of Unplanned Readmission Score: 9.79         Current admission status: Inpatient   Preferred Pharmacy:   [facility] [address] [address]  Phone: [phone] Fax: [phone]    Primary Care Provider: No primary care provider on file.    Primary Insurance: [employer] With  Secondary Insurance:     DISCHARGE DETAILS:    Discharge planning discussed with:: Pt, spouse Brigido  Freedom of Choice: Yes  Comments - [place] of Choice: [facility] contacted family/caregiver?: Yes  Were Treatment Team discharge recommendations reviewed with patient/caregiver?: Yes  Did patient/caregiver verbalize understanding of patient care needs?: N/A- going to facility  Were patient/caregiver advised of the risks associated with not following Treatment Team discharge recommendations?: Yes    Contacts  Patient Contacts: Brigido  Relationship to Patient:: Family  Contact Method: In Person  Reason/Outcome: Discharge Planning    Requested Home Health Care         Is the patient interested in C at discharge?: No      Other Referral/Resources/Interventions Provided:  Interventions: Short Term Rehab    Would you like to participate in our [facility] service program?  : No - Declined    Treatment Team Recommendation: Short Term Rehab  Discharge Destination Plan:: Short Term  Rehab  Transport at Discharge : [facility]  Dispatcher Contacted: Yes        ETA of Transport (Date): 03/15/24  ETA of Transport (Time): 1500    Accepting Facility Name, City & State : [facility]  Receiving Facility/Agency Phone Number: Phone: [phone]  Facility/Agency Fax Number: Fax: [phone]       [facility] claimed the ride for Madalyn Dhaliwal in unit/room  bed -01, and will arrive on 03/15/2024 at 4:00pm EDT. Contact them at [phone]

## 2024-03-15 NOTE — DISCHARGE SUMMARY
[facility]  Discharge- Madalyn Dhaliwal 1940, 83 y.o. female MRN: [id number]  Unit/Bed#: [facility] Encounter: [id number]  Primary Care Provider: No primary care provider on file.   Date and time admitted to hospital: 3/12/2024  4:54 PM    * Closed fracture of left hip with routine healing  Assessment & Plan  Patient presents this evening after sustaining mechanical fall from standing. Patient ambulating when hit with high morris of wind making her lose her balance and fell upon left hip, denies striking head. Patient without complaints of loss of consciousness, lightheadedness, dizziness, shaking behavior, urinary or bladder incontinence  with pain and inability to ambulate after the event  xray revealed left femoral neck fracture, nondisplaced, noncomminuted  Orthopedics consulted - had surgical fixation 3/13. Will need DVT prophylaxis for 3 weeks post op and follow up outpatient in 2 weeks  Index of suspicion for purely mechanical fall from standing  Pain management  PT/OT post op - recommend rehab    Delirium  Assessment & Plan  Post op and hospital delirium   Family reports intermittent confusion at baseline   Reorient as needed    Obesity, Class I, BMI 30-34.9  Assessment & Plan  Continue to educate on lifestyle changes    Leukemoid reaction  Assessment & Plan  Likely secondary to post op  No signs of infection, remains afebrile  Plain film chest x-ray unrevealing  UA normal  CBC is to be done 5 days after discharge    Fall from standing  Assessment & Plan  As above  No history of syncope or seizure  Per history, appears purely mechanical  Check orthostatics  EKG and UA normal  Will require PT/OT evaluation - recommend rehab        Medical Problems       Resolved Problems  Date Reviewed: 3/15/2024   None       Discharging Physician / Practitioner: Viry Espinoza PA-C  PCP: No primary care provider on file.  Admission Date:   Admission Orders (From admission, onward)       Ordered         03/13/24 0929  Inpatient Admission  Once            03/12/24 1929  Place in Observation  Once                          Discharge Date: 03/15/24    Consultations During Hospital Stay:  Orthopedic surgery    Procedures Performed:   insertion of nail in left femur on 3/13    Significant Findings / Test Results:   Left knee x-ray with no acute osseous abnormality.  Severe osteoarthritis  X-ray hips with mildly comminuted left intertrochanteric fracture    Incidental Findings:   none     Test Results Pending at Discharge (will require follow up):   none     Outpatient Tests Requested:  Cbc in 5 days    Complications:  none    Reason for Admission: closed fracture of left hip    Hospital Course:   Madalyn Dhaliwal is a 83 y.o. female patient who originally presented to the hospital on 3/12/2024 due to mechanical fall while leaving a store.  Orthopedics was consulted due to closed intertrochanteric fracture of left hip.  Patient underwent insertion of nail and left femur on 3/13 with orthopedics.  PT/OT was consulted towards and recommending short-term rehab on discharge.  Patient with routine postop.  Patient is to follow-up outpatient with orthopedics in 2 weeks and continue Lovenox daily for 3 weeks.    Please see above list of diagnoses and related plan for additional information.     Condition at Discharge: good    Discharge Day Visit / Exam:   Subjective: Patient states that she is feeling well today and pain is managed.  Denies chest pain or shortness of breath.  Does not offer any further complaints at this time.  Vitals: Blood Pressure: 112/72 (03/15/24 0737)  Pulse: 85 (03/15/24 0737)  Temperature: 98.2 °F (36.8 °C) (03/15/24 0737)  Temp Source: Temporal (03/14/24 2210)  Respirations: 17 (03/15/24 0737)  Height: 5' (152.4 cm) (03/14/24 1400)  Weight - Scale: 84.6 kg (186 lb 8.2 oz) (03/14/24 0557)  SpO2: 91 % (03/15/24 0737)  Exam:   Physical Exam  Vitals reviewed.   Constitutional:       General: She is not  in acute distress.     Appearance: Normal appearance. She is not ill-appearing.   HENT:      Head: Normocephalic and atraumatic.      Nose: Nose normal.      Mouth/Throat:      Mouth: Mucous membranes are moist.      Pharynx: Oropharynx is clear.   Eyes:      Extraocular Movements: Extraocular movements intact.      Conjunctiva/sclera: Conjunctivae normal.   Cardiovascular:      Rate and Rhythm: Normal rate and regular rhythm.      Pulses: Normal pulses.      Heart sounds: Normal heart sounds. No murmur heard.  Pulmonary:      Effort: Pulmonary effort is normal. No respiratory distress.      Breath sounds: Normal breath sounds. No wheezing or rhonchi.   Abdominal:      General: Abdomen is flat. Bowel sounds are normal. There is no distension.      Palpations: Abdomen is soft.      Tenderness: There is no abdominal tenderness. There is no guarding.   Musculoskeletal:         General: Normal range of motion.      Cervical back: Normal range of motion.      Right lower leg: No edema.      Left lower leg: No edema.   Skin:     General: Skin is warm.   Neurological:      General: No focal deficit present.      Mental Status: She is alert and oriented to person, place, and time. Mental status is at baseline.      Motor: No weakness.   Psychiatric:         Mood and Affect: Mood normal.         Behavior: Behavior normal.         Thought Content: Thought content normal.         Judgment: Judgment normal.          Discussion with Family: Updated  () at bedside.    Discharge instructions/Information to patient and family:   See after visit summary for information provided to patient and family.      Provisions for Follow-Up Care:  See after visit summary for information related to follow-up care and any pertinent home health orders.      Mobility at time of Discharge:   Basic Mobility Inpatient Raw Score: 8  -HLM Goal: 3: Sit at edge of bed  -HLM Achieved: 4: Move to chair/commode  HLM Goal achieved.  Continue to encourage appropriate mobility.     Disposition:   Other Skilled Nursing Facility at [facility]    Planned Readmission: none     Discharge Statement:  I spent 45 minutes discharging the patient. This time was spent on the day of discharge. I had direct contact with the patient on the day of discharge. Greater than 50% of the total time was spent examining patient, answering all patient questions, arranging and discussing plan of care with patient as well as directly providing post-discharge instructions.  Additional time then spent on discharge activities.    Discharge Medications:  See after visit summary for reconciled discharge medications provided to patient and/or family.      **Please Note: This note may have been constructed using a voice recognition system**

## 2024-03-15 NOTE — PLAN OF CARE
Problem: PHYSICAL THERAPY ADULT  Goal: Performs mobility at highest level of function for planned discharge setting.  See evaluation for individualized goals.  Description: Treatment/Interventions: ADL retraining, Functional transfer training, LE strengthening/ROM, Elevations, Therapeutic exercise, Endurance training, Patient/family training, Equipment eval/education, Bed mobility, Gait training, Cognitive reorientation, Compensatory technique education, Spoke to nursing, Spoke to case management, OT          See flowsheet documentation for full assessment, interventions and recommendations.  Outcome: Progressing  Note: Prognosis: Good  Problem List: Decreased strength, Decreased endurance, Impaired balance, Decreased mobility, Decreased cognition, Impaired judgement, Decreased safety awareness, Impaired hearing, Obesity, Pain, Decreased skin integrity  Assessment: Pt tolerated session fairly. She requires increased time to complete mobility and increased time to respond. She continues to require increased verbal cues for safety and technique with mobility. She requires increased assistance to achieve standing this date and continues to require increased assistance to complete ambulation. She requires verbal and manual cues for proper completion of LE TE. She is limited by decreased strength, balance, endurance and increased pain. She will continue to benefit from PT services to maximize LOF.  Barriers to Discharge: Decreased caregiver support, Inaccessible home environment  Barriers to Discharge Comments: requires increased assistance with all mobility. increased fall risk. decreased cognition  Rehab Resource Intensity Level, PT: I (Maximum Resource Intensity)    See flowsheet documentation for full assessment.

## 2024-03-15 NOTE — OCCUPATIONAL THERAPY NOTE
Occupational Therapy Progress Note     Patient Name: Madalyn Dhaliwal  Today's Date: 3/15/2024  Problem List  Principal Problem:    Closed fracture of left hip with routine healing  Active Problems:    Fall from standing    Leukemoid reaction    Obesity, Class I, BMI 30-34.9    Delirium     03/15/24 1050   Note Type   Note Type Treatment   Pain Assessment   Pain Assessment Tool 0-10   Pain Score No Pain   Restrictions/Precautions   Weight Bearing Precautions Per Order Yes   LLE Weight Bearing Per Order WBAT   Other Precautions Cognitive;Chair Alarm;Bed Alarm;Fall Risk   ADL   Grooming Assistance 5  Supervision/Setup   Grooming Deficit Setup;Increased time to complete   Grooming Comments Pt able brush hair and teeth while seated with cues for task initiation and cessation   LB Dressing Assistance 1  Total Assistance   LB Dressing Deficit Setup;Requires assistive device for steadying;Verbal cueing;Increased time to complete;Don/doff L sock;Don/doff R sock;Thread RLE into underwear;Thread LLE into underwear;Pull up over hips   LB Dressing Comments Assist to don socks and thread briefs while seated   Toileting Assistance  1  Total Assistance   Toileting Deficit Setup;Verbal cueing;Increased time to complete;Perineal hygiene;Clothing management down;Clothing management up   Toileting Comments Assist for pericare and clothing management while standing with BUE support on RW   Bed Mobility   Supine to Sit 2  Maximal assistance   Additional items Assist x 2;Increased time required;Verbal cues;LE management   Additional Comments Pt supine in bed at beginning of session. Supine to sit @ Max A x2.   Transfers   Sit to Stand 2  Maximal assistance   Additional items Assist x 2;Increased time required;Verbal cues   Stand to Sit 2  Maximal assistance   Additional items Assist x 1;Increased time required;Verbal cues   Stand pivot 2  Maximal assistance   Additional items Assist x 1;Increased time required;Verbal cues   Toilet  transfer 2  Maximal assistance   Additional items Assist x 2;Increased time required;Verbal cues;Commode   Additional Comments STS from EOB, BSC and chair to RW @ Max A x2 with cues for hand placement. SPT from EOB > BSC > recliner  chair @ Max A x1. Pt seated OOB in chair at end of session with chair alarm intact, call bell within reach and all needs met.   Functional Mobility   Additional Comments Pt able to complete short distance functional mobility with RW @ Max A x1 and chair follow for safety.   Cognition   Overall Cognitive Status Impaired   Arousal/Participation Alert;Responsive;Cooperative   Attention Attends with cues to redirect   Orientation Level Oriented to person;Oriented to place;Oriented to situation;Disoriented to time   Memory Decreased long term memory;Decreased short term memory;Decreased recall of biographical information;Decreased recall of precautions;Decreased recall of recent events   Following Commands Follows one step commands with increased time or repetition   Activity Tolerance   Activity Tolerance Patient limited by pain   Medical Staff Made Aware Spoke with PT Kiesha and SIOBHAN Arora   Assessment   Assessment Pt completed OT tx session #2 focused on ADL performance and functional mobility. Pt alert and agreeable to participate. PT/OT co-treat completed d/t significant mobility deficits and safety concerns. Pt demonstrated slight improvements in cognition since evaluation but required increased assistance for all ADL tasks and transfers. Pt currently completing UB ADLs @ Min A for task initiation, LB ADLs @ Total A, and functional mobility with RW @ Max A. Pt demonstrating Good participation and Good potential to achieve goals but is currently demonstrating deficits in decrease activity tolerance, decrease standing balance, decrease performance during ADL tasks, decrease cognition, decrease UB MS, decrease generalized strength, decrease activity engagement, and decrease performance  during functional transfers. Continue to recommend Level I: Maximum Resource Intensity Therapy upon discharge to increase safety and independence in ADL tasks and functional mobility.   Plan   Treatment Interventions ADL retraining;Functional transfer training   Goal Expiration Date 03/28/24   OT Treatment Day 2   OT Frequency 3-5x/wk   Discharge Recommendation   Rehab Resource Intensity Level, OT I (Maximum Resource Intensity)   AM-PAC Daily Activity Inpatient   Lower Body Dressing 1   Bathing 2   Toileting 1   Upper Body Dressing 3   Grooming 3   Eating 4   Daily Activity Raw Score 14   Daily Activity Standardized Score (Calc for Raw Score >=11) 33.39   -PAC Applied Cognition Inpatient   Following a Speech/Presentation 2   Understanding Ordinary Conversation 3   Taking Medications 1   Remembering Where Things Are Placed or Put Away 1   Remembering List of 4-5 Errands 1   Taking Care of Complicated Tasks 1   Applied Cognition Raw Score 9   Applied Cognition Standardized Score 22.48     The patient's raw score on the AM-PAC Daily Activity Inpatient Short Form is 14. A raw score of less than 19 suggests the patient may benefit from discharge to post-acute rehabilitation services. Please refer to the recommendation of the Occupational Therapist for safe discharge planning.    Pt goals to be met by 3/28/2024:     Pt will demonstrate ability to complete grooming/hygiene tasks @ mod I after set-up.  Pt will demonstrate ability to complete supine<>sit @ mod I in order to increase safety and independence during ADL tasks.  Pt will demonstrate ability to complete UB ADLs including washing/dressing @ mod I in order to increase performance and participation during meaningful tasks  Pt will demonstrate ability to complete LB dressing @ mod I in order to increase safety and independence during meaningful tasks.   Pt will demonstrate ability to aida/doff socks/shoes while sitting EOB/chair @ mod I in order to increase safety  and independence during meaningful tasks.   Pt will demonstrate ability to complete toileting tasks including CM and pericare @ mod I in order to increase safety and independence during meaningful tasks.  Pt will demonstrate ability to complete EOB, chair, toilet/commode transfers @ mod I in order to increase performance and participation during functional tasks.  Pt will demonstrate ability to stand for 10 minutes while maintaining F+ balance with use of RW for UB support PRN.  Pt will demonstrate ability to tolerate 20 minute OT session with no vc'ing for deep breathing or use of energy conservation techniques in order to increase activity tolerance during functional tasks.   Pt will demonstrate Good carryover of use of energy conservation/compensatory strategies during ADLs and functional tasks in order to increase safety and reduce risk for falls.   Pt will demonstrate Good attention and participation in continued evaluation of functional ambulation house hold distances in order to assist with safe d/c planning.  Pt will attend to continued cognitive assessments 100% of the time in order to provide most appropriate d/c recommendations.   Pt will follow 100% simple 2-step commands and be A&O x4 consistently with environmental cues to increase participation in functional activities.   Pt will identify 3 areas of interest/hobbies and 1 intervention on how to incorporate into daily life in order to increase interaction with environment and peers as well as increase participation in meaningful tasks.   Pt will demonstrate 100% carryover of BUE HEP in order to increase BUE MS and increase performance during functional tasks upon d/c home.    Annetta Olivas, OTR/L

## 2024-03-15 NOTE — PHYSICAL THERAPY NOTE
"   PHYSICAL THERAPY TREATMENT NOTE  NAME:  Madalyn Dhaliwal  DATE: 03/15/24    Length Of Stay: 2  Performed at least 2 patient identifiers during session: Name and Birthday    TREATMENT FLOW SHEET:    03/15/24 1051   PT Last Visit   PT Visit Date 03/15/24   Note Type   Note Type Treatment   Pain Assessment   Pain Assessment Tool FLACC   Pain Rating: FLACC (Rest) - Face 0   Pain Rating: FLACC (Rest) - Legs 0   Pain Rating: FLACC (Rest) - Activity 0   Pain Rating: FLACC (Rest) - Cry 0   Pain Rating: FLACC (Rest) - Consolability 0   Score: FLACC (Rest) 0   Pain Rating: FLACC (Activity) - Face 1   Pain Rating: FLACC (Activity) - Legs 0   Pain Rating: FLACC (Activity) - Activity 0   Pain Rating: FLACC (Activity) - Cry 1   Pain Rating: FLACC (Activity) - Consolability 0   Score: FLACC (Activity) 2   Restrictions/Precautions   Weight Bearing Precautions Per Order Yes   LLE Weight Bearing Per Order WBAT   Other Precautions Cognitive;Chair Alarm;Bed Alarm;Fall Risk;Pain   General   Chart Reviewed Yes   Response to Previous Treatment Patient unable to report, no changes reported from family or staff   Cognition   Orientation Level Oriented to person;Oriented to place;Oriented to situation;Disoriented to time   Following Commands Follows one step commands with increased time or repetition   Comments inc time to respond, repeated cues   Subjective   Subjective \"okay\"   Bed Mobility   Supine to Sit 2  Maximal assistance   Additional items Assist x 2;Increased time required;Verbal cues;LE management  (trunk management)   Additional Comments HOB elevated ~ 30 degrees. required maxAx2 for trunk and LE management with verbal cues for technique and sequence.   Transfers   Sit to Stand 2  Maximal assistance   Additional items Assist x 2;Increased time required;Verbal cues   Stand to Sit 2  Maximal assistance   Additional items Assist x 1;Increased time required;Verbal cues   Stand pivot 2  Maximal assistance   Additional items Assist x " 1;Increased time required;Verbal cues   Additional Comments use of RW. min to mod verbal cues for hand placement. sit to stand with maxAx2 from bed, BSC and recliner with inc posteiror lean requiring manual cues for ant wt shifting and to achieve standing. spt with RW with maxAx1 with manual cues for wt shifting, left LE advancement and verbal cues for turnign completely. completed spt 2x.   Ambulation/Elevation   Gait pattern Wide NICHOLAS;Antalgic;Improper Weight shift;Decreased foot clearance;Decreased L stance;Short stride;Excessively slow   Gait Assistance 2  Maximal assist   Additional items Assist x 1;Verbal cues   Assistive Device Rolling walker   Distance ambulated 2' forward and then 5'x1 with RW and chair follow with maxAx1 with manual cues for left LE advancement intiially and then patient able to advance Left LE without assistance. continued to require maximal manual cues for wt shifrting and verbal cues for technique and sequence.   Balance   Static Sitting Fair   Dynamic Sitting Fair -   Static Standing Fair -   Dynamic Standing Poor   Ambulatory Poor -   Activity Tolerance   Activity Tolerance Patient limited by fatigue;Patient limited by pain   Medical Staff Made Aware Annetta WEBB   Nurse Made Aware Maite MCCANN   Exercises   Hip Flexion Sitting;10 reps;AROM;Right;AAROM;Left   Hip Adduction Sitting;10 reps;Bilateral  (isometric)   Knee AROM Long Arc Quad Sitting;10 reps;AROM;Right;AAROM;Left   Ankle Pumps Sitting;10 reps;AROM;Bilateral   Assessment   Prognosis Good   Problem List Decreased strength;Decreased endurance;Impaired balance;Decreased mobility;Decreased cognition;Impaired judgement;Decreased safety awareness;Impaired hearing;Obesity;Pain;Decreased skin integrity   Barriers to Discharge Decreased caregiver support;Inaccessible home environment   Barriers to Discharge Comments requires increased assistance with all mobility. increased fall risk. decreased cognition   Goals   Patient Goals none stated    PT Treatment Day 2   Plan   Treatment/Interventions ADL retraining;Functional transfer training;LE strengthening/ROM;Elevations;Therapeutic exercise;Endurance training;Patient/family training;Equipment eval/education;Bed mobility;Gait training;Compensatory technique education;Spoke to nursing;Spoke to case management;OT;Cognitive reorientation   Progress Slow progress, decreased activity tolerance   PT Frequency 4-6x/wk   Discharge Recommendation   Rehab Resource Intensity Level, PT I (Maximum Resource Intensity)   AM-PAC Basic Mobility Inpatient   Turning in Flat Bed Without Bedrails 1   Lying on Back to Sitting on Edge of Flat Bed Without Bedrails 1   Moving Bed to Chair 2   Standing Up From Chair Using Arms 1   Walk in Room 2   Climb 3-5 Stairs With Railing 1   Basic Mobility Inpatient Raw Score 8   Turning Head Towards Sound 4   Follow Simple Instructions 3   Low Function Basic Mobility Raw Score  15   Low Function Basic Mobility Standardized Score  23.9   [facility] Highest Level Of Mobility   -[facility] Goal 3: Sit at edge of bed   -[facility] Achieved 4: Move to chair/commode   Education   Education Provided Mobility training;Home exercise program;Assistive device   Patient Reinforcement needed   End of Consult   Patient Position at End of Consult Bedside chair;Bed/Chair alarm activated;All needs within reach       The patient's AM-PAC Basic Mobility Inpatient Short Form Raw Score is 8. A Raw score of less than or equal to 16 suggests the patient may benefit from discharge to post-acute rehabilitation services. Please also refer to the recommendation of the Physical Therapist for safe discharge planning.     Pt tolerated session fairly. She requires increased time to complete mobility and increased time to respond. She continues to require increased verbal cues for safety and technique with mobility. She requires increased assistance to achieve standing this date and continues to require increased assistance to  complete ambulation. She requires verbal and manual cues for proper completion of LE TE. She is limited by decreased strength, balance, endurance and increased pain. She will continue to benefit from PT services to maximize LOF.       Kiesha Ledesma, PT,DPT

## 2024-03-18 NOTE — UTILIZATION REVIEW
NOTIFICATION OF ADMISSION DISCHARGE   This is a Notification of Discharge from [facility]. Please be advised that this patient has been discharge from our facility. Below you will find the admission and discharge date and time including the patient’s disposition.   UTILIZATION REVIEW CONTACT:  Sri Pablo  Utilization   Network Utilization Review Department  Phone: [phone] x carefully listen to the prompts. All voicemails are confidential.  Email: [email or web address]     ADMISSION INFORMATION  PRESENTATION DATE: 3/12/2024  4:54 PM  OBERVATION ADMISSION DATE:   INPATIENT ADMISSION DATE: 3/13/24  9:30 AM   DISCHARGE DATE: 3/15/2024  4:30 PM   DISPOSITION:Non [facility] SNF/TCU/SNU    Network Utilization Review Department  ATTENTION: Please call with any questions or concerns to [phone] and carefully listen to the prompts so that you are directed to the right person. All voicemails are confidential.   For Discharge needs, contact Care Management DC Support Team at [phone] opt. 2  Send all requests for admission clinical reviews, approved or denied determinations and any other requests to dedicated fax number below belonging to the campus where the patient is receiving treatment. List of dedicated fax numbers for the Facilities:  FACILITY NAME UR FAX NUMBER   ADMISSION DENIALS (Administrative/Medical Necessity) [phone]   DISCHARGE SUPPORT TEAM ([facility]) [phone]   PARENT CHILD HEALTH (Maternity/NICU/Pediatrics) [phone]   [facility] [phone]   [facility] [phone]   [facility] [phone]   General acute hospital [phone]   [facility] [phone]   [facility] [phone]   [facility] [phone]   [facility]  [place] [phone]   [facility] [phone]   [facility] [phone]   [facility] [phone]   [facility] [phone]

## 2024-03-28 ENCOUNTER — HOSPITAL ENCOUNTER (OUTPATIENT)
Dept: RADIOLOGY | Facility: CLINIC | Age: 84
DRG: 175 | End: 2024-03-28
Payer: COMMERCIAL

## 2024-03-28 ENCOUNTER — OFFICE VISIT (OUTPATIENT)
Dept: OBGYN CLINIC | Facility: CLINIC | Age: 84
End: 2024-03-28

## 2024-03-28 VITALS
BODY MASS INDEX: 36.52 KG/M2 | HEART RATE: 79 BPM | OXYGEN SATURATION: 90 % | HEIGHT: 60 IN | TEMPERATURE: 97.8 F | DIASTOLIC BLOOD PRESSURE: 70 MMHG | WEIGHT: 186 LBS | SYSTOLIC BLOOD PRESSURE: 110 MMHG

## 2024-03-28 DIAGNOSIS — Z09 POSTOP CHECK: Primary | ICD-10-CM

## 2024-03-28 DIAGNOSIS — S72.002D CLOSED FRACTURE OF LEFT HIP WITH ROUTINE HEALING: ICD-10-CM

## 2024-03-28 PROCEDURE — 99024 POSTOP FOLLOW-UP VISIT: CPT | Performed by: ORTHOPAEDIC SURGERY

## 2024-03-28 PROCEDURE — 73502 X-RAY EXAM HIP UNI 2-3 VIEWS: CPT

## 2024-03-28 NOTE — PROGRESS NOTES
Patient Name:  Madalyn Dhaliwal  MRN:  [id number]    Assessment     1. Postop check        2. Intertrochanteric fracture proximal left femur, subsequent encounter, routine healing  XR hip/pelv 2-3 vws left if performed        Plan     Continue weightbearing as tolerated on the left lower extremity with walker.  Return to orthopedic office in 4 weeks with repeat x-ray and follow-up exam.  Lovenox total 3 weeks postop for DVT prophylaxis.  She may work with physical therapy for range of motion of the ankle knee and hip as well as gait training.  10 Steri-Strips to distal incision for another 7 to 10 days.    Return in about 4 weeks (around 4/25/2024) for X-ray left hip, Recheck.    Subjective   Madalyn Dhaliwal returns for follow-up of left TFN short nail, DOS 3/13/2024.  The patient is 2 week(s) post op and returns for routine follow-up x-ray. Patient complains of some mild to moderate discomfort about the hip yet.  She denies any calf pain or lower leg pain.  Reports she has been doing minimal ambulation.    Objective     /70 (BP Location: Left arm)   Pulse 79   Temp 97.8 °F (36.6 °C)   Ht 5' (1.524 m)   Wt 84.4 kg (186 lb)   SpO2 90%   BMI 36.33 kg/m²     Left hip were removed by MA in office.  Proximal wound well-healed.  Distal wound the distal edge is near complete healing.  Steri-Strips were applied just for secondary reinforcement.  These shall remain in place another 7 to 10 days.  No pain with passive hip flexion.  She is able to actively extend the knee and dorsi and plantarflex the ankle while sitting in the wheelchair.  Some mild discomfort with rotation laterally.  Sensation intact.    Data Review     I have personally reviewed pertinent films in PACS documenting maintained position alignment of the fracture and hardware in satisfactory position.    Rell Arndt PA-C

## 2024-03-28 NOTE — PATIENT INSTRUCTIONS
Continue weightbearing as tolerated on the left lower extremity with walker.  Return to orthopedic office in 4 weeks with repeat x-ray and follow-up exam.  Lovenox total 3 weeks postop for DVT prophylaxis.  She may work with physical therapy for range of motion of the ankle knee and hip as well as gait training.  10 Steri-Strips to distal incision for another 7 to 10 days.

## 2024-03-31 ENCOUNTER — APPOINTMENT (EMERGENCY)
Dept: CT IMAGING | Facility: HOSPITAL | Age: 84
DRG: 175 | End: 2024-03-31
Payer: COMMERCIAL

## 2024-03-31 ENCOUNTER — HOSPITAL ENCOUNTER (INPATIENT)
Facility: HOSPITAL | Age: 84
LOS: 2 days | Discharge: HOSPICE | DRG: 175 | End: 2024-04-02
Attending: FAMILY MEDICINE | Admitting: FAMILY MEDICINE
Payer: COMMERCIAL

## 2024-03-31 ENCOUNTER — APPOINTMENT (EMERGENCY)
Dept: RADIOLOGY | Facility: HOSPITAL | Age: 84
DRG: 175 | End: 2024-03-31
Payer: COMMERCIAL

## 2024-03-31 DIAGNOSIS — Z51.5 COMFORT MEASURES ONLY STATUS: ICD-10-CM

## 2024-03-31 DIAGNOSIS — I26.09 ACUTE PULMONARY EMBOLISM WITH ACUTE COR PULMONALE, UNSPECIFIED PULMONARY EMBOLISM TYPE (HCC): ICD-10-CM

## 2024-03-31 DIAGNOSIS — R06.03 RESPIRATORY DISTRESS: Primary | ICD-10-CM

## 2024-03-31 PROBLEM — R79.89 ELEVATED TROPONIN: Status: ACTIVE | Noted: 2024-03-31

## 2024-03-31 PROBLEM — D72.829 LEUKOCYTOSIS: Status: ACTIVE | Noted: 2024-03-31

## 2024-03-31 PROBLEM — E66.9 OBESITY, CLASS I, BMI 30-34.9: Chronic | Status: ACTIVE | Noted: 2024-03-13

## 2024-03-31 PROBLEM — J96.01 ACUTE RESPIRATORY FAILURE WITH HYPOXIA (HCC): Status: ACTIVE | Noted: 2024-03-31

## 2024-03-31 PROBLEM — D64.9 ANEMIA: Chronic | Status: ACTIVE | Noted: 2024-03-15

## 2024-03-31 PROBLEM — D72.823 LEUKEMOID REACTION: Status: RESOLVED | Noted: 2024-03-12 | Resolved: 2024-03-31

## 2024-03-31 PROBLEM — R73.9 HYPERGLYCEMIA: Status: ACTIVE | Noted: 2024-03-31

## 2024-03-31 PROBLEM — D64.9 ANEMIA: Status: ACTIVE | Noted: 2024-03-15

## 2024-03-31 PROBLEM — R74.01 TRANSAMINITIS: Status: ACTIVE | Noted: 2024-03-31

## 2024-03-31 LAB
2HR DELTA HS TROPONIN: 375 NG/L
ABO GROUP BLD: NORMAL
ABO GROUP BLD: NORMAL
ALBUMIN SERPL BCP-MCNC: 3.4 G/DL (ref 3.5–5)
ALP SERPL-CCNC: 119 U/L (ref 34–104)
ALT SERPL W P-5'-P-CCNC: 60 U/L (ref 7–52)
ANION GAP SERPL CALCULATED.3IONS-SCNC: 10 MMOL/L (ref 4–13)
APTT PPP: 25 SECONDS (ref 23–37)
ARTERIAL PATENCY WRIST A: YES
AST SERPL W P-5'-P-CCNC: 70 U/L (ref 13–39)
BACTERIA UR QL AUTO: ABNORMAL /HPF
BASE EXCESS BLDA CALC-SCNC: -1.1 MMOL/L
BASOPHILS # BLD AUTO: 0.03 THOUSANDS/ÂΜL (ref 0–0.1)
BASOPHILS NFR BLD AUTO: 0 % (ref 0–1)
BILIRUB SERPL-MCNC: 0.49 MG/DL (ref 0.2–1)
BILIRUB UR QL STRIP: ABNORMAL
BLD GP AB SCN SERPL QL: NEGATIVE
BNP SERPL-MCNC: 349 PG/ML (ref 0–100)
BUN SERPL-MCNC: 26 MG/DL (ref 5–25)
CALCIUM ALBUM COR SERPL-MCNC: 9.9 MG/DL (ref 8.3–10.1)
CALCIUM SERPL-MCNC: 9.4 MG/DL (ref 8.4–10.2)
CARDIAC TROPONIN I PNL SERPL HS: 1460 NG/L
CARDIAC TROPONIN I PNL SERPL HS: 1835 NG/L
CHLORIDE SERPL-SCNC: 107 MMOL/L (ref 96–108)
CLARITY UR: CLEAR
CO2 SERPL-SCNC: 24 MMOL/L (ref 21–32)
COLOR UR: YELLOW
CREAT SERPL-MCNC: 0.78 MG/DL (ref 0.6–1.3)
EOSINOPHIL # BLD AUTO: 0 THOUSAND/ÂΜL (ref 0–0.61)
EOSINOPHIL NFR BLD AUTO: 0 % (ref 0–6)
ERYTHROCYTE [DISTWIDTH] IN BLOOD BY AUTOMATED COUNT: 13.9 % (ref 11.6–15.1)
FLUAV RNA RESP QL NAA+PROBE: NEGATIVE
FLUBV RNA RESP QL NAA+PROBE: NEGATIVE
GFR SERPL CREATININE-BSD FRML MDRD: 71 ML/MIN/1.73SQ M
GLUCOSE SERPL-MCNC: 227 MG/DL (ref 65–140)
GLUCOSE UR STRIP-MCNC: ABNORMAL MG/DL
HCO3 BLDA-SCNC: 21.4 MMOL/L (ref 22–28)
HCT VFR BLD AUTO: 37.5 % (ref 34.8–46.1)
HGB BLD-MCNC: 11.5 G/DL (ref 11.5–15.4)
HGB UR QL STRIP.AUTO: ABNORMAL
IMM GRANULOCYTES # BLD AUTO: 0.09 THOUSAND/UL (ref 0–0.2)
IMM GRANULOCYTES NFR BLD AUTO: 1 % (ref 0–2)
INR PPP: 1.22 (ref 0.84–1.19)
KETONES UR STRIP-MCNC: ABNORMAL MG/DL
LACTATE SERPL-SCNC: 1.5 MMOL/L (ref 0.5–2)
LACTATE SERPL-SCNC: 2.1 MMOL/L (ref 0.5–2)
LEUKOCYTE ESTERASE UR QL STRIP: NEGATIVE
LYMPHOCYTES # BLD AUTO: 1.13 THOUSANDS/ÂΜL (ref 0.6–4.47)
LYMPHOCYTES NFR BLD AUTO: 10 % (ref 14–44)
MAGNESIUM SERPL-MCNC: 2.1 MG/DL (ref 1.9–2.7)
MCH RBC QN AUTO: 29.9 PG (ref 26.8–34.3)
MCHC RBC AUTO-ENTMCNC: 30.7 G/DL (ref 31.4–37.4)
MCV RBC AUTO: 97 FL (ref 82–98)
MONOCYTES # BLD AUTO: 0.41 THOUSAND/ÂΜL (ref 0.17–1.22)
MONOCYTES NFR BLD AUTO: 4 % (ref 4–12)
MUCOUS THREADS UR QL AUTO: ABNORMAL
NEUTROPHILS # BLD AUTO: 10.15 THOUSANDS/ÂΜL (ref 1.85–7.62)
NEUTS SEG NFR BLD AUTO: 85 % (ref 43–75)
NITRITE UR QL STRIP: NEGATIVE
NON VENT TYPE- NRB: 15
NON-SQ EPI CELLS URNS QL MICRO: ABNORMAL /HPF
NRBC BLD AUTO-RTO: 0 /100 WBCS
O2 CT BLDA-SCNC: 15.9 ML/DL (ref 16–23)
OXYHGB MFR BLDA: 89.9 % (ref 94–97)
PCO2 BLDA: 29.2 MM HG (ref 36–44)
PH BLDA: 7.48 [PH] (ref 7.35–7.45)
PH UR STRIP.AUTO: 5.5 [PH]
PLATELET # BLD AUTO: 440 THOUSANDS/UL (ref 149–390)
PMV BLD AUTO: 9.6 FL (ref 8.9–12.7)
PO2 BLDA: 60.6 MM HG (ref 75–129)
POTASSIUM SERPL-SCNC: 4.7 MMOL/L (ref 3.5–5.3)
PROCALCITONIN SERPL-MCNC: 0.16 NG/ML
PROT SERPL-MCNC: 7 G/DL (ref 6.4–8.4)
PROT UR STRIP-MCNC: ABNORMAL MG/DL
PROTHROMBIN TIME: 15.7 SECONDS (ref 11.6–14.5)
RBC # BLD AUTO: 3.85 MILLION/UL (ref 3.81–5.12)
RBC #/AREA URNS AUTO: ABNORMAL /HPF
RH BLD: POSITIVE
RH BLD: POSITIVE
RSV RNA RESP QL NAA+PROBE: NEGATIVE
SARS-COV-2 RNA RESP QL NAA+PROBE: NEGATIVE
SODIUM SERPL-SCNC: 141 MMOL/L (ref 135–147)
SP GR UR STRIP.AUTO: >=1.03 (ref 1–1.03)
SPECIMEN EXPIRATION DATE: NORMAL
SPECIMEN SOURCE: ABNORMAL
TSH SERPL DL<=0.05 MIU/L-ACNC: 1.22 UIU/ML (ref 0.45–4.5)
UROBILINOGEN UR QL STRIP.AUTO: 0.2 E.U./DL
WBC # BLD AUTO: 11.81 THOUSAND/UL (ref 4.31–10.16)
WBC #/AREA URNS AUTO: ABNORMAL /HPF

## 2024-03-31 PROCEDURE — 82805 BLOOD GASES W/O2 SATURATION: CPT | Performed by: PHYSICIAN ASSISTANT

## 2024-03-31 PROCEDURE — 71045 X-RAY EXAM CHEST 1 VIEW: CPT

## 2024-03-31 PROCEDURE — 86901 BLOOD TYPING SEROLOGIC RH(D): CPT | Performed by: PHYSICIAN ASSISTANT

## 2024-03-31 PROCEDURE — 99223 1ST HOSP IP/OBS HIGH 75: CPT | Performed by: NURSE PRACTITIONER

## 2024-03-31 PROCEDURE — 85610 PROTHROMBIN TIME: CPT | Performed by: PHYSICIAN ASSISTANT

## 2024-03-31 PROCEDURE — 81001 URINALYSIS AUTO W/SCOPE: CPT | Performed by: PHYSICIAN ASSISTANT

## 2024-03-31 PROCEDURE — 94002 VENT MGMT INPAT INIT DAY: CPT

## 2024-03-31 PROCEDURE — 96366 THER/PROPH/DIAG IV INF ADDON: CPT

## 2024-03-31 PROCEDURE — 86850 RBC ANTIBODY SCREEN: CPT | Performed by: PHYSICIAN ASSISTANT

## 2024-03-31 PROCEDURE — 83735 ASSAY OF MAGNESIUM: CPT | Performed by: PHYSICIAN ASSISTANT

## 2024-03-31 PROCEDURE — 94760 N-INVAS EAR/PLS OXIMETRY 1: CPT

## 2024-03-31 PROCEDURE — 36415 COLL VENOUS BLD VENIPUNCTURE: CPT | Performed by: PHYSICIAN ASSISTANT

## 2024-03-31 PROCEDURE — 83036 HEMOGLOBIN GLYCOSYLATED A1C: CPT | Performed by: FAMILY MEDICINE

## 2024-03-31 PROCEDURE — 71275 CT ANGIOGRAPHY CHEST: CPT

## 2024-03-31 PROCEDURE — 85025 COMPLETE CBC W/AUTO DIFF WBC: CPT | Performed by: PHYSICIAN ASSISTANT

## 2024-03-31 PROCEDURE — 36600 WITHDRAWAL OF ARTERIAL BLOOD: CPT

## 2024-03-31 PROCEDURE — 84443 ASSAY THYROID STIM HORMONE: CPT | Performed by: NURSE PRACTITIONER

## 2024-03-31 PROCEDURE — 99285 EMERGENCY DEPT VISIT HI MDM: CPT

## 2024-03-31 PROCEDURE — 85730 THROMBOPLASTIN TIME PARTIAL: CPT | Performed by: PHYSICIAN ASSISTANT

## 2024-03-31 PROCEDURE — 74177 CT ABD & PELVIS W/CONTRAST: CPT

## 2024-03-31 PROCEDURE — 83880 ASSAY OF NATRIURETIC PEPTIDE: CPT | Performed by: PHYSICIAN ASSISTANT

## 2024-03-31 PROCEDURE — 84145 PROCALCITONIN (PCT): CPT | Performed by: PHYSICIAN ASSISTANT

## 2024-03-31 PROCEDURE — 96365 THER/PROPH/DIAG IV INF INIT: CPT

## 2024-03-31 PROCEDURE — 0241U HB NFCT DS VIR RESP RNA 4 TRGT: CPT | Performed by: PHYSICIAN ASSISTANT

## 2024-03-31 PROCEDURE — 83605 ASSAY OF LACTIC ACID: CPT | Performed by: PHYSICIAN ASSISTANT

## 2024-03-31 PROCEDURE — 86900 BLOOD TYPING SEROLOGIC ABO: CPT | Performed by: PHYSICIAN ASSISTANT

## 2024-03-31 PROCEDURE — 84484 ASSAY OF TROPONIN QUANT: CPT | Performed by: PHYSICIAN ASSISTANT

## 2024-03-31 PROCEDURE — 93005 ELECTROCARDIOGRAM TRACING: CPT

## 2024-03-31 PROCEDURE — 96375 TX/PRO/DX INJ NEW DRUG ADDON: CPT

## 2024-03-31 PROCEDURE — 87040 BLOOD CULTURE FOR BACTERIA: CPT | Performed by: PHYSICIAN ASSISTANT

## 2024-03-31 PROCEDURE — 99291 CRITICAL CARE FIRST HOUR: CPT | Performed by: EMERGENCY MEDICINE

## 2024-03-31 PROCEDURE — 80053 COMPREHEN METABOLIC PANEL: CPT | Performed by: PHYSICIAN ASSISTANT

## 2024-03-31 RX ORDER — HEPARIN SODIUM 1000 [USP'U]/ML
3200 INJECTION, SOLUTION INTRAVENOUS; SUBCUTANEOUS EVERY 6 HOURS PRN
Status: DISCONTINUED | OUTPATIENT
Start: 2024-03-31 | End: 2024-03-31

## 2024-03-31 RX ORDER — HEPARIN SODIUM 10000 [USP'U]/100ML
3-30 INJECTION, SOLUTION INTRAVENOUS
Status: DISCONTINUED | OUTPATIENT
Start: 2024-03-31 | End: 2024-03-31

## 2024-03-31 RX ORDER — BISACODYL 10 MG
10 SUPPOSITORY, RECTAL RECTAL DAILY PRN
Status: DISCONTINUED | OUTPATIENT
Start: 2024-03-31 | End: 2024-04-02 | Stop reason: HOSPADM

## 2024-03-31 RX ORDER — HYDROMORPHONE HCL/PF 1 MG/ML
0.5 SYRINGE (ML) INJECTION
Status: DISCONTINUED | OUTPATIENT
Start: 2024-03-31 | End: 2024-04-02 | Stop reason: HOSPADM

## 2024-03-31 RX ORDER — GLYCOPYRROLATE 0.2 MG/ML
0.1 INJECTION INTRAMUSCULAR; INTRAVENOUS EVERY 4 HOURS PRN
Status: DISCONTINUED | OUTPATIENT
Start: 2024-03-31 | End: 2024-04-02 | Stop reason: HOSPADM

## 2024-03-31 RX ORDER — HEPARIN SODIUM 1000 [USP'U]/ML
6400 INJECTION, SOLUTION INTRAVENOUS; SUBCUTANEOUS ONCE
Status: COMPLETED | OUTPATIENT
Start: 2024-03-31 | End: 2024-03-31

## 2024-03-31 RX ORDER — LORAZEPAM 2 MG/ML
1 INJECTION INTRAMUSCULAR
Status: DISCONTINUED | OUTPATIENT
Start: 2024-03-31 | End: 2024-04-02 | Stop reason: HOSPADM

## 2024-03-31 RX ORDER — HEPARIN SODIUM 1000 [USP'U]/ML
6400 INJECTION, SOLUTION INTRAVENOUS; SUBCUTANEOUS EVERY 6 HOURS PRN
Status: DISCONTINUED | OUTPATIENT
Start: 2024-03-31 | End: 2024-03-31

## 2024-03-31 RX ORDER — CEFTRIAXONE 1 G/50ML
1000 INJECTION, SOLUTION INTRAVENOUS ONCE
Status: DISCONTINUED | OUTPATIENT
Start: 2024-03-31 | End: 2024-03-31

## 2024-03-31 RX ADMIN — HYDROMORPHONE HYDROCHLORIDE 0.5 MG: 1 INJECTION, SOLUTION INTRAMUSCULAR; INTRAVENOUS; SUBCUTANEOUS at 22:40

## 2024-03-31 RX ADMIN — HYDROMORPHONE HYDROCHLORIDE 0.5 MG: 1 INJECTION, SOLUTION INTRAMUSCULAR; INTRAVENOUS; SUBCUTANEOUS at 13:34

## 2024-03-31 RX ADMIN — IOHEXOL 100 ML: 350 INJECTION, SOLUTION INTRAVENOUS at 10:54

## 2024-03-31 RX ADMIN — HEPARIN SODIUM 6400 UNITS: 1000 INJECTION INTRAVENOUS; SUBCUTANEOUS at 11:38

## 2024-03-31 RX ADMIN — HEPARIN SODIUM 18 UNITS/KG/HR: 10000 INJECTION, SOLUTION INTRAVENOUS at 11:38

## 2024-03-31 NOTE — ASSESSMENT & PLAN NOTE
2/2 submassive pe with cor pulmonale  Bipap 12/6 50%  ABG mild resp alkalosis  Pending GOC consider HFNC  Wean for sats>92%

## 2024-03-31 NOTE — ED NOTES
Critical care team at bedside to eval pt and speak with family     POLINA Delatorre  03/31/24 3687

## 2024-03-31 NOTE — PLAN OF CARE
Problem: Potential for Falls  Goal: Patient will remain free of falls  Description: INTERVENTIONS:  - Educate patient/family on patient safety including physical limitations  - Instruct patient to call for assistance with activity   - Consult OT/PT to assist with strengthening/mobility   - Keep Call bell within reach  - Keep bed low and locked with side rails adjusted as appropriate  - Keep care items and personal belongings within reach  - Initiate and maintain comfort rounds  - Make Fall Risk Sign visible to staff  - Offer Toileting every    Hours, in advance of need  - Initiate/Maintain   alarm  - Obtain necessary fall risk management equipment:           - Apply yellow socks and bracelet for high fall risk patients  - Consider moving patient to room near nurses station  Outcome: Progressing     Problem: Prexisting or High Potential for Compromised Skin Integrity  Goal: Skin integrity is maintained or improved  Description: INTERVENTIONS:  - Identify patients at risk for skin breakdown  - Assess and monitor skin integrity  - Assess and monitor nutrition and hydration status  - Monitor labs   - Assess for incontinence   - Turn and reposition patient  - Assist with mobility/ambulation  - Relieve pressure over bony prominences  - Avoid friction and shearing  - Provide appropriate hygiene as needed including keeping skin clean and dry  - Evaluate need for skin moisturizer/barrier cream  - Collaborate with interdisciplinary team   - Patient/family teaching  - Consider wound care consult   Outcome: Progressing

## 2024-03-31 NOTE — ED ATTENDING ATTESTATION
3/31/2024  IMissy DO, saw and evaluated the patient. I have discussed the patient with the resident/non-physician practitioner and agree with the resident's/non-physician practitioner's findings, Plan of Care, and MDM as documented in the resident's/non-physician practitioner's note, except where noted. All available labs and Radiology studies were reviewed.  I was present for key portions of any procedure(s) performed by the resident/non-physician practitioner and I was immediately available to provide assistance.       At this point I agree with the current assessment done in the Emergency Department.  I have conducted an independent evaluation of this patient a history and physical is as follows:    ED Course     83-year-old female presents to the ED for evaluation of shortness of breath and increased work of breathing.  Patient initially placed on BiPAP and CTA of chest shows bilateral PEs.  Critical care bedside to see the patient.  Patient being admitted    Critical Care Time  Procedures

## 2024-03-31 NOTE — ASSESSMENT & PLAN NOTE
CTA-Acute bilateral pulmonary embolism, large on the right with nearly complete occlusion of the right main pulmonary artery and small on the left with clot arborizing into upper and lower lobe branches. RV/LV 1.4    [id number]  POC ECHO with RV dilation  Heparin gtt VTE  No TNK with stable hemodynamics/recent L hip surgery  GOC conversations given bedbound status since hip surgery with poor oral intake. Daughter coming to hospital to further discuss with .  Check LE duplex

## 2024-03-31 NOTE — NURSING NOTE
Pt resting in bed. No monitoring in place per order. Pt asleep and appears to be resting comfortably. Family at bedside.

## 2024-03-31 NOTE — ACP (ADVANCE CARE PLANNING)
Critical Care Advanced Care Planning Note  Madalyn Dhaliwal 83 y.o. female MRN: [id number]  Unit/Bed#: ED 02 Encounter: [id number]    Madalyn Dhaliwal is a 83 y.o. female requiring critical care evaluation and advanced care planning. The patient has chronic comorbidities, including but not limited to recent L hip fx, which is now further complicated by the following acute conditions: acute submassive pe.  Due to the severity of the patient's acute condition and/or the extent of chronic conditions, additional conversations pertaining to advanced care planning were required. Today's discussion, which was held in a face-to-face meeting, included  , daughter, , granddaughter , and it was established that all stake holders understood the rationale for the advanced care planning. The patient was unable to participate in the discussion due to mental status.    Summary of Discussion:  Discussed patients quality of life with  and he stated her clinical decline post L hip surgery with inability to ambulate and poor oral intake, and lethargy sleeping most of the day. Explained significance of her medical issues prompting the ER visit being due to Pulmonary Embolism affecting the functioning of the right heart which could result in death. Given significant decline post operatively and inability to participate in rehab or ambulate we discussed options of comfort measures, medical management with blood thinners, or more aggressive therapy of possible thrombectomy in addition to code status.  asked for  and daughter.    Upon daughters arrival discussed the above and overall decision was made that Madalyn would not want aggressive measures or to remain in the nursing home as she was as a quality of life indicator. Therefore given her respiratory failure and significant right heart strain coupled with recent hip fx and poor functional status decision was made to transition to comfort measures only.   at bedside and emotional support given to family.      Total time spent, (25) minutes (1240 to 1305).      CODE STATUS: COMFORT CARE - Level 4  POA:    POLST:      SIGNATURE: POLINA Foy  DATE: March 31, 2024  TIME: 1:52 PM

## 2024-03-31 NOTE — ACP (ADVANCE CARE PLANNING)
I was called to evaluate Madalyn for respiratory failure on BiPAP and encephalopathy with large PE.     I had in depth conversation with both Ozzy  and Carissa daughter. Unfortunately Madalyn has been struggling since her hip fracture despite successful operation. Her appetite has been poor and she has been sleeping much of the time with limited mobility.     After talking with Carissa and Ozzy they felt that she was not happy with her current quality of life. I explained that with her functional status and large PE that she had a high risk of death and there was no realistic hope of her returning to her prior baseline a month ago.     They told me that in this scenario she would not want any medical care to attempt to get her back to nursing home in a deconditioned state. They elected for comfort care.     was bedside to say prayer with family and Madalyn. Bereavement cart ordered.     I spent 35 minutes discussion goals of care with family.

## 2024-03-31 NOTE — H&P
[facility]  H&P  Name: Madalyn Dhaliwal 83 y.o. female I MRN: [id number]  Unit/Bed#: ED 02 I Date of Admission: 3/31/2024   Date of Service: 3/31/2024 I Hospital Day: 0      Assessment/Plan   * Acute pulmonary embolism with acute cor pulmonale (HCC)  Assessment & Plan  CTA-Acute bilateral pulmonary embolism, large on the right with nearly complete occlusion of the right main pulmonary artery and small on the left with clot arborizing into upper and lower lobe branches. RV/LV 1.4    [id number]  POC ECHO with RV dilation  Heparin gtt VTE  No TNK with stable hemodynamics/recent L hip surgery  GOC conversations given bedbound status since hip surgery with poor oral intake. Daughter coming to hospital to further discuss with .  Check LE duplex    Acute respiratory failure with hypoxia (HCC)  Assessment & Plan  2/2 submassive pe with cor pulmonale  Bipap 12/6 50%  ABG mild resp alkalosis  Pending GOC consider HFNC  Wean for sats>92%    Elevated troponin  Assessment & Plan  2/2 R heart strain  Unable to view EKG in muse will review    Transaminitis  Assessment & Plan  In setting of R heart strain  trend    Toxic encephalopathy  Assessment & Plan  TME r/o intracranial path  Would favor CTH prior to initiating VTE anticoagulation. Will initiate anticoag as GOC discussion pending  Nonfocal  Neuro checks  Check tsh/ammonia    Hyperglycemia  Assessment & Plan  SSI  Check a1c    Leukocytosis  Assessment & Plan  Reactionary no s/s infection  Check UA/BC  PCT 0.16    Anemia  Assessment & Plan  Improving from post op  Baseline hgb 12      Obesity, Class I, BMI 30-34.9  Assessment & Plan  Dietary education when able    Intertrochanteric fracture proximal left femur, subsequent encounter, routine healing  Assessment & Plan  S/p L IM nail 3/13  D/c 3/15 to NH  Enoxaparin therapeutic changed to hep gtt with PE       GOALS OF CARE DISCUSSION:  -COMFORT MEASURES ONLY  -hydromorphone/ativan  PRN  -d/c bipap      History of Present Illness     HPI: Madalyn Dhaliwla is a 83 y.o. with PMH recent L hip fx 3/13 discharge to NH 3/15 who presents with SOB. Per  pt has been bedbound not participating in ambulation/PT at NH with poor oral intake since discharge.     On admission to ER hypoxic in resp distress on NRB placed on bipap. CTA chest revealed b/l PE large on right with near complete occlusion of R main pulm artery and sm on left with clot into ALMA and LL lobes. RV/LV 1.4 . Trop 1835. Increased LFT's. Critical care was called to evaluate pt.    After discussing overall decline post hip fracture with  decision was made to hold on aggressive tx, initiate hep gtt and have further goc discussion with daughter at bedside understanding that pt could decompensate in the meantime.     GOC discussion held with daughter, , granddaughter, and . Will transition to comfort measure only. Discuss with dr. Zacarias to admit to [facility].    History obtained from chart review and unobtainable from patient due to mental status.  Review of Systems   Unable to perform ROS: Mental status change     Disposition: Stepdown Level 1  Historical Information   No past medical history on file. Past Surgical History:  4/16/2014: MRI BREAST BIOPSY LEFT (ALL INCLUSIVE); Left  3/13/2024: AR OPTX FEM SHFT FX W/INSJ IMED IMPLT W/WO SCREW; Left      Comment:  Procedure: INSERTION NAIL IM FEMUR ANTEGRADE                (TROCHANTERIC);  Surgeon: Prabhu Huber;  Location:                 MAIN OR;  Service: Orthopedics   Current Outpatient Medications   Medication Instructions    acetaminophen (TYLENOL) 500 mg, Oral, Every 6 hours PRN    docusate sodium (COLACE) 100 mg, Oral, 2 times daily    enoxaparin (LOVENOX) 40 mg, Subcutaneous, Daily    No Known Allergies   Social History     Tobacco Use    Smoking status: Never    Smokeless tobacco: Never   Vaping Use    Vaping status: Never Used   Substance Use Topics     Alcohol use: Never    Drug use: Never    No family history on file.       Objective                            Vitals I/O      Most Recent Min/Max in 24hrs   Temp 98.6 °F (37 °C) Temp  Min: 98.6 °F (37 °C)  Max: 98.6 °F (37 °C)   Pulse 87 Pulse  Min: 84  Max: 96   Resp (!) 31 Resp  Min: 28  Max: 45   /79 BP  Min: 106/78  Max: 168/90   O2 Sat 95 % SpO2  Min: 85 %  Max: 96 %    No intake or output data in the 24 hours ending 03/31/24 1259    No diet orders on file    Invasive Monitoring           Physical Exam   Physical Exam  Eyes:      Pupils: Pupils are equal, round, and reactive to light.   Skin:     General: Skin is warm and dry.      Comments: L hip dsg cdi   HENT:      Head: Normocephalic and atraumatic.      Mouth/Throat:      Mouth: Mucous membranes are dry.   Cardiovascular:      Rate and Rhythm: Normal rate and regular rhythm.      Pulses: Normal pulses.      Heart sounds: Normal heart sounds.   Musculoskeletal:         General: Swelling present.      Right lower leg: Trace Edema present.      Left lower leg: Trace Edema present.   Abdominal: General: Bowel sounds are normal. There is no distension.      Palpations: Abdomen is soft.      Tenderness: There is no abdominal tenderness.   Constitutional:       General: She is not in acute distress.     Appearance: She is ill-appearing.   Pulmonary:      Effort: Tachypnea present. No respiratory distress.      Breath sounds: Normal breath sounds.   Neurological:      General: No focal deficit present.      GCS: GCS eye subscore is 3. GCS verbal subscore is 1. GCS motor subscore is 5.            Diagnostic Studies      EKG: pending review  Imaging:  I have personally reviewed pertinent reports.   and I have personally reviewed pertinent films in PACS     Medications:  Scheduled PRN      heparin (porcine), 3,200 Units, Q6H PRN  heparin (porcine), 6,400 Units, Q6H PRN       Continuous    heparin (porcine), 3-30 Units/kg/hr (Order-Specific), Last Rate: 18  Units/kg/hr (03/31/24 1138)         Labs:    CBC    Recent Labs     03/31/24  0956   WBC 11.81*   HGB 11.5   HCT 37.5   *     BMP    Recent Labs     03/31/24  0956   SODIUM 141   K 4.7      CO2 24   AGAP 10   BUN 26*   CREATININE 0.78   CALCIUM 9.4       Coags    Recent Labs     03/31/24  0956   INR 1.22*   PTT 25        Additional Electrolytes  Recent Labs     03/31/24  0956   MG 2.1          Blood Gas    Recent Labs     03/31/24  1002   PHART 7.482*   [id number] 29.2*   PO2ART 60.6*   [id number] 21.4*   BEART -1.1   SOURCE Radial, Left     Recent Labs     03/31/24  1002   SOURCE Radial, Left    LFTs  Recent Labs     03/31/24  0956   ALT 60*   AST 70*   ALKPHOS 119*   ALB 3.4*   TBILI 0.49       Infectious  Recent Labs     03/31/24  0956   PROCALCITONI 0.16     Glucose  Recent Labs     03/31/24  0956   GLUC 227*             Anticipated Length of Stay is > 2 midnights  POLINA Foy

## 2024-03-31 NOTE — ED NOTES
Respiratory called prior to arrival and aware of need for BiPAP.      Padmini Sullivan RN  03/31/24 9916

## 2024-03-31 NOTE — ED NOTES
Patient transported to CT monitored with RN and respiratory therapy on bipap.      POLINA Delatorre  03/31/24 1100

## 2024-03-31 NOTE — ED PROVIDER NOTES
History  Chief Complaint   Patient presents with    Respiratory Distress     From [facility] for further evaluation of low O2 sat. Patient arrives via BLS on NRB. Patient with recent hospitalization follow hip fx/replacement, on Lovenox.      The patient is an 84 y/o female who presents with the c/o SOB by EMS from [facility]. The patient had suffered a left hip fracture with fall 3/12. The patient had surgical fixation 3/13 and was discharge to rehab facility. The patient was found to have pulse ox in low 80s at facility. En route the patients oxygen was 83 on a non-rebreather. The patient denies any complaints of pain.  Per spouse the patient has been sleeping a lot at the facility and not very interactive.        Shortness of Breath  Duration:  1 day  Timing:  Constant  Chronicity:  New  Relieved by:  Nothing  Worsened by:  Nothing  Ineffective treatments:  Oxygen  Associated symptoms: no chest pain    Risk factors: recent surgery        Prior to Admission Medications   Prescriptions Last Dose Informant Patient Reported? Taking?   acetaminophen (TYLENOL) 500 mg tablet   No No   Sig: Take 1 tablet (500 mg total) by mouth every 6 (six) hours as needed for mild pain   docusate sodium (COLACE) 100 mg capsule   No No   Sig: Take 1 capsule (100 mg total) by mouth 2 (two) times a day   enoxaparin (LOVENOX) 40 mg/0.4 mL   No No   Sig: Inject 0.4 mL (40 mg total) under the skin daily for 20 days      Facility-Administered Medications: None       No past medical history on file.    Past Surgical History:   Procedure Laterality Date    MRI BREAST BIOPSY LEFT (ALL INCLUSIVE) Left 4/16/2014    CT OPTX FEM SHFT FX W/INSJ IMED IMPLT W/WO SCREW Left 3/13/2024    Procedure: INSERTION NAIL IM FEMUR ANTEGRADE (TROCHANTERIC);  Surgeon: Prabhu Huber;  Location:  MAIN OR;  Service: Orthopedics       No family history on file.  I have reviewed and agree with the history as documented.    E-Cigarette/Vaping    E-Cigarette Use  Never User      E-Cigarette/Vaping Substances     Social History     Tobacco Use    Smoking status: Never    Smokeless tobacco: Never   Vaping Use    Vaping status: Never Used   Substance Use Topics    Alcohol use: Never    Drug use: Never       Review of Systems   Respiratory:  Positive for shortness of breath.    Cardiovascular:  Negative for chest pain.   All other systems reviewed and are negative.      Physical Exam  Physical Exam  Vitals and nursing note reviewed.   Constitutional:       General: She is in acute distress.      Appearance: She is well-developed. She is ill-appearing.   HENT:      Head: Normocephalic and atraumatic.      Mouth/Throat:      Mouth: Mucous membranes are dry.   Eyes:      Extraocular Movements: Extraocular movements intact.      Conjunctiva/sclera: Conjunctivae normal.      Pupils: Pupils are equal, round, and reactive to light.   Cardiovascular:      Rate and Rhythm: Regular rhythm. Tachycardia present.      Pulses: Normal pulses.      Heart sounds: No murmur heard.  Pulmonary:      Effort: Respiratory distress present.      Breath sounds: Wheezing present.   Abdominal:      General: There is no distension.      Palpations: Abdomen is soft.      Tenderness: There is no abdominal tenderness. There is no right CVA tenderness or left CVA tenderness.   Musculoskeletal:         General: No swelling.      Cervical back: Neck supple.      Right lower leg: Edema present.      Left lower leg: Edema present.   Skin:     General: Skin is warm and dry.      Capillary Refill: Capillary refill takes less than 2 seconds.   Neurological:      Mental Status: She is alert.   Psychiatric:         Mood and Affect: Mood normal.         Vital Signs  ED Triage Vitals   Temperature Pulse Respirations Blood Pressure SpO2   03/31/24 0945 03/31/24 0945 03/31/24 0945 03/31/24 0945 03/31/24 0945   98.6 °F (37 °C) 94 (!) 36 106/78 (!) 85 %      Temp src Heart Rate Source Patient Position - Orthostatic VS BP  Location FiO2 (%)   -- 03/31/24 0945 03/31/24 0945 03/31/24 0945 03/31/24 1008    Monitor Lying Right arm 60      Pain Score       03/31/24 0945       No Pain           Vitals:    03/31/24 1200 03/31/24 1230 03/31/24 1300 03/31/24 1330   BP: 140/76 150/79 151/83 117/62   Pulse: 86 87 89 82   Patient Position - Orthostatic VS: Lying Lying Lying Lying         Visual Acuity      ED Medications  Medications   heparin (porcine) 25,000 units in 0.45% NaCl 250 mL infusion (premix) (0 Units/kg/hr × 80 kg (Order-Specific) Intravenous Stopped 3/31/24 1332)   heparin (porcine) injection 6,400 Units (has no administration in time range)   heparin (porcine) injection 3,200 Units (has no administration in time range)   HYDROmorphone (DILAUDID) injection 0.5 mg (0.5 mg Intravenous Given 3/31/24 1334)   LORazepam (ATIVAN) injection 1 mg ( Intravenous Not Given 3/31/24 1336)   glycopyrrolate (ROBINUL) injection 0.1 mg (has no administration in time range)   bisacodyl (DULCOLAX) rectal suppository 10 mg (has no administration in time range)   iohexol (OMNIPAQUE) 350 MG/ML injection (MULTI-DOSE) 100 mL (100 mL Intravenous Given 3/31/24 1054)   heparin (porcine) injection 6,400 Units (6,400 Units Intravenous Given 3/31/24 1138)       Diagnostic Studies  Results Reviewed       Procedure Component Value Units Date/Time    TSH, 3rd generation with Free T4 reflex [[id number]]  (Normal) Collected: 03/31/24 0956    Lab Status: Final result Specimen: Blood from Arm, Right Updated: 03/31/24 1313     TSH 3RD GENERATON 1.221 uIU/mL     Lactic acid 2 Hours [[id number]]  (Normal) Collected: 03/31/24 1242    Lab Status: Final result Specimen: Blood from Arm, Left Updated: 03/31/24 1302     LACTIC ACID 1.5 mmol/L     Narrative:      Result may be elevated if tourniquet was used during collection.    Hemoglobin A1C w/ EAG Estimation [[id number]] Collected: 03/31/24 1010    Lab Status: In process Specimen: Blood Updated: 03/31/24 1257    HS Troponin I  2hr [[id number]]  (Abnormal) Collected: 03/31/24 1154    Lab Status: Final result Specimen: Blood from Arm, Left Updated: 03/31/24 1220     hs TnI 2hr 1,835 ng/L      Delta 2hr hsTnI 375 ng/L     Lactic acid [[id number]]  (Abnormal) Collected: 03/31/24 0956    Lab Status: Edited Result - FINAL Specimen: Blood from Arm, Right Updated: 03/31/24 1048     LACTIC ACID 2.1 mmol/L     Narrative:      Result may be elevated if tourniquet was used during collection.    B-Type Natriuretic Peptide(BNP) [[id number]]  (Abnormal) Collected: 03/31/24 0956    Lab Status: Final result Specimen: Blood from Arm, Right Updated: 03/31/24 1043      pg/mL     FLU/RSV/COVID - if FLU/RSV clinically relevant [[id number]]  (Normal) Collected: 03/31/24 0956    Lab Status: Final result Specimen: Nares from Nose Updated: 03/31/24 1041     SARS-CoV-2 Negative     INFLUENZA A PCR Negative     INFLUENZA B PCR Negative     RSV PCR Negative    Narrative:      FOR PEDIATRIC PATIENTS - copy/paste COVID Guidelines URL to browser: [email or web address]    SARS-CoV-2 assay is a Nucleic Acid Amplification assay intended for the  qualitative detection of nucleic acid from SARS-CoV-2 in nasopharyngeal  swabs. Results are for the presumptive identification of SARS-CoV-2 RNA.    Positive results are indicative of infection with SARS-CoV-2, the virus  causing COVID-19, but do not rule out bacterial infection or co-infection  with other viruses. Laboratories within the [place] and its  territories are required to report all positive results to the appropriate  public health authorities. Negative results do not preclude SARS-CoV-2  infection and should not be used as the sole basis for treatment or other  patient management decisions. Negative results must be combined with  clinical observations, patient history, and epidemiological information.  This test has not been FDA cleared or approved.    This  test has been authorized by FDA under an Emergency Use Authorization  (EUA). This test is only authorized for the duration of time the  declaration that circumstances exist justifying the authorization of the  emergency use of an in vitro diagnostic tests for detection of SARS-CoV-2  virus and/or diagnosis of COVID-19 infection under section 564(b)(1) of  the Act, 21 U.S.C. 360bbb-3(b)(1), unless the authorization is terminated  or revoked sooner. The test has been validated but independent review by FDA  and CLIA is pending.    Test performed using [employer] GeneXpert: This RT-PCR assay targets N2,  a region unique to SARS-CoV-2. A conserved region in the E-gene was chosen  for pan-Sarbecovirus detection which includes SARS-CoV-2.    According to CMS-2020-01-R, this platform meets the definition of high-throughput technology.    Procalcitonin [[id number]]  (Normal) Collected: 03/31/24 0956    Lab Status: Final result Specimen: Blood from Arm, Right Updated: 03/31/24 1037     Procalcitonin 0.16 ng/ml     HS Troponin 0hr (reflex protocol) [[id number]]  (Abnormal) Collected: 03/31/24 0956    Lab Status: Final result Specimen: Blood from Arm, Right Updated: 03/31/24 1034     hs TnI 0hr 1,460 ng/L     Comprehensive metabolic panel [[id number]]  (Abnormal) Collected: 03/31/24 0956    Lab Status: Final result Specimen: Blood from Arm, Right Updated: 03/31/24 1027     Sodium 141 mmol/L      Potassium 4.7 mmol/L      Chloride 107 mmol/L      CO2 24 mmol/L      ANION GAP 10 mmol/L      BUN 26 mg/dL      Creatinine 0.78 mg/dL      Glucose 227 mg/dL      Calcium 9.4 mg/dL      Corrected Calcium 9.9 mg/dL      AST 70 U/L      ALT 60 U/L      Alkaline Phosphatase 119 U/L      Total Protein 7.0 g/dL      Albumin 3.4 g/dL      Total Bilirubin 0.49 mg/dL      eGFR 71 ml/min/1.73sq m     Narrative:      National Kidney Disease Foundation guidelines for Chronic Kidney Disease (CKD):     Stage 1 with normal or high GFR (GFR > 90  mL/min/1.73 square meters)    Stage 2 Mild CKD (GFR = 60-89 mL/min/1.73 square meters)    Stage 3A Moderate CKD (GFR = 45-59 mL/min/1.73 square meters)    Stage 3B Moderate CKD (GFR = 30-44 mL/min/1.73 square meters)    Stage 4 Severe CKD (GFR = 15-29 mL/min/1.73 square meters)    Stage 5 End Stage CKD (GFR <15 mL/min/1.73 square meters)  Note: GFR calculation is accurate only with a steady state creatinine    Magnesium [[id number]]  (Normal) Collected: 03/31/24 0956    Lab Status: Final result Specimen: Blood from Arm, Right Updated: 03/31/24 1027     Magnesium 2.1 mg/dL     Protime-INR [[id number]]  (Abnormal) Collected: 03/31/24 0956    Lab Status: Final result Specimen: Blood from Arm, Right Updated: 03/31/24 1025     Protime 15.7 seconds      INR 1.22    APTT [[id number]]  (Normal) Collected: 03/31/24 0956    Lab Status: Final result Specimen: Blood from Arm, Right Updated: 03/31/24 1025     PTT 25 seconds     Urine Microscopic [[id number]]  (Abnormal) Collected: 03/31/24 0956    Lab Status: Final result Specimen: Urine, Straight Cath Updated: 03/31/24 1012     RBC, UA 0-1 /hpf      WBC, UA 0-5 /hpf      Epithelial Cells Occasional /hpf      Bacteria, UA Moderate /hpf      MUCUS THREADS Moderate    UA w Reflex to Microscopic w Reflex to Culture [[id number]]  (Abnormal) Collected: 03/31/24 0956    Lab Status: Final result Specimen: Urine, Straight Cath Updated: 03/31/24 1010     Color, UA Yellow     Clarity, UA Clear     Specific Gravity, UA >=1.030     pH, UA 5.5     Leukocytes, UA Negative     Nitrite, UA Negative     Protein, UA Trace mg/dl      Glucose,  (1/10%) mg/dl      Ketones, UA Trace mg/dl      Urobilinogen, UA 0.2 E.U./dl      Bilirubin, UA Small     Occult Blood, UA Small    Blood gas, Arterial [[id number]]  (Abnormal) Collected: 03/31/24 1002    Lab Status: Final result Specimen: Blood, Arterial from Radial, Left Updated: 03/31/24 1006     pH, Arterial 7.482     pCO2, Arterial 29.2 mm Hg      pO2,  "Arterial 60.6 mm Hg      HCO3, Arterial 21.4 mmol/L      Base Excess, Arterial -1.1 mmol/L      O2 Content, Arterial 15.9 mL/dL      O2 HGB,Arterial  89.9 %      SOURCE Radial, Left     JORDY TEST Yes     Non Vent type- NRB 15    CBC and differential [[id number]]  (Abnormal) Collected: 03/31/24 0956    Lab Status: Final result Specimen: Blood from Arm, Right Updated: 03/31/24 1006     WBC 11.81 Thousand/uL      RBC 3.85 Million/uL      Hemoglobin 11.5 g/dL      Hematocrit 37.5 %      MCV 97 fL      MCH 29.9 pg      MCHC 30.7 g/dL      RDW 13.9 %      MPV 9.6 fL      Platelets 440 Thousands/uL      nRBC 0 /100 WBCs      Neutrophils Relative 85 %      Immature Grans % 1 %      Lymphocytes Relative 10 %      Monocytes Relative 4 %      Eosinophils Relative 0 %      Basophils Relative 0 %      Neutrophils Absolute 10.15 Thousands/µL      Absolute Immature Grans 0.09 Thousand/uL      Absolute Lymphocytes 1.13 Thousands/µL      Absolute Monocytes 0.41 Thousand/µL      Eosinophils Absolute 0.00 Thousand/µL      Basophils Absolute 0.03 Thousands/µL     Blood culture #2 [[id number]] Collected: 03/31/24 0956    Lab Status: In process Specimen: Blood from Arm, Right Updated: 03/31/24 1002    Blood culture #1 [[id number]] Collected: 03/31/24 0955    Lab Status: In process Specimen: Blood from Arm, Left Updated: 03/31/24 1002                   PE Study with CT Abdomen and Pelvis with contrast   Final Result by Samy Cannon MD (03/31 1150)         1. Acute bilateral pulmonary embolism, large on the right with nearly complete occlusion of the right main pulmonary artery and small on the left with clot arborizing into upper and lower lobe branches.      The calculated ratio of right ventricular to left ventricular diameter (RV/LV ratio) is 1.4.      There is a critical finding of acute PE with RV/LV ratio >0.9. Based on PERT algorithm recommendations:    \"  Order STAT biomarkers including troponin, NT-BNP or BNP    \"  " Calculate PESI score:   o  If PESI score falls in I-III, with negative biomarkers, please order a PERT priority ECHO.   o  If PESI score falls in IV-V or with positive biomarkers, please order STAT ECHO and alert the Industry PERT via PAC at [phone].         2. Recent postoperative changes reflecting ORIF left femoral intertrochanteric fracture.   3. Additional findings as noted.            I personally discussed this study with ERICA CALLES on 3/31/2024 11:48 AM.            Workstation performed: [id number]         XR chest portable    (Results Pending)              Procedures  CriticalCare Time    Date/Time: 3/31/2024 1:05 PM    Performed by: Erica Calles PA-C  Authorized by: Erica Calles PA-C    Critical care provider statement:     Critical care time (minutes):  120    Critical care time was exclusive of:  Separately billable procedures and treating other patients    Critical care was necessary to treat or prevent imminent or life-threatening deterioration of the following conditions:  Respiratory failure    Critical care was time spent personally by me on the following activities:  Blood draw for specimens, obtaining history from patient or surrogate, development of treatment plan with patient or surrogate, discussions with consultants, evaluation of patient's response to treatment, examination of patient, re-evaluation of patient's condition, ordering and review of radiographic studies, ordering and review of laboratory studies, ordering and performing treatments and interventions, interpretation of cardiac output measurements and review of old charts    I assumed direction of critical care for this patient from another provider in my specialty: no    ECG 12 Lead Documentation Only    Date/Time: 3/31/2024 1:06 PM    Performed by: Erica Calles PA-C  Authorized by: Erica Calles PA-C    Indications / Diagnosis:  Sob  Patient location:  ED  Previous ECG:     Previous ECG:   Unavailable  Interpretation:     Interpretation: non-specific    Rate:     ECG rate:  93    ECG rate assessment: tachycardic    Rhythm:     Rhythm: sinus tachycardia             ED Course  ED Course as of 03/31/24 1358   Sun Mar 31, 2024   1006 WBC(!): 11.81   1037 hs TnI 0hr(!): 1,460   1106 Reaching out ICU    1239 ICU is having goals of care discussion with family    1327 Thorough discussion and further testing the patient and family have agreed on comfort measures per ICU, recommendations are admit to [facility] service for comfort measures.  Speaking with Dr. Zacarias.                                             Medical Decision Making  The patient is an 82 y/o female who presents with the c/o SOB by EMS from [facility]. The patient had suffered a left hip fracture with fall 3/12. The patient had surgical fixation 3/13 and was discharge to rehab facility. The patient was found to have pulse ox in low 80s at facility. En route the patients oxygen was 83 on a non-rebreather. The patient denies any complaints of pain.  Per spouse the patient has been sleeping a lot at the facility and not very interactive.    Patient upon arrival was 86% on a nonrebreather.  Patient's lower lung sounds noted to have slight wheeze.  Patient had lower leg edema bilaterally.  Patient's blood pressure however was stable.  Patient was tachycardic.  Patient was placed on BiPAP for hypoxia.  The patient on lab work demonstrated a slight leukocytosis and lactic acidosis however the patient with imaging and further testing did not have any infectious etiology found.  The patient on CTA was found to have a very large obstructive right PE in the pulmonary artery.  Patient at this time not a tPA candidate due to recent surgery.  ICU discussed goals of care with the patient and family.  This time wanting comfort care so discussed with medicine for admission.      Differential diagnosis included but was not limited to :Pneumonia, Sinusitis, URI,  ACS, GERD, PE, Viral syndrome, bacteremia, CHF      Amount and/or Complexity of Data Reviewed  Independent Historian: alma  External Data Reviewed: labs and radiology.  Labs: ordered. Decision-making details documented in ED Course.  Radiology: ordered and independent interpretation performed. Decision-making details documented in ED Course.  ECG/medicine tests: ordered and independent interpretation performed. Decision-making details documented in ED Course.  Discussion of management or test interpretation with external provider(s): Dr. Ugarte with ICU  Dr. Zacarias     Risk  Prescription drug management.  Decision regarding hospitalization.        PESI  Age: 83 years old  Sex: 0  History of Cancer: 0  History of Heart Failure: 0  History of Chronic Lung Disease: 0  Heart rate greater than or equal to 110: 0  Systolic BP < 100 mmH  Respiratory rate greater than or equal to 30: 20  Temperature <36°C/96.8°F: 0  Altered Mental Status (Disorientation, lethargy, stupor, or coma): 60  O2 saturation <90%: 20  PESI Score Results: 181        Disposition  Final diagnoses:   Respiratory distress   Acute pulmonary embolism with acute cor pulmonale, unspecified pulmonary embolism type (HCC)     Time reflects when diagnosis was documented in both MDM as applicable and the Disposition within this note       Time User Action Codes Description Comment    3/31/2024 11:49 AM Chilo Hernández Add [R06.03] Respiratory distress     3/31/2024 11:49 AM Chilo Hernández [I26.09] Acute pulmonary embolism with acute cor pulmonale, unspecified pulmonary embolism type (HCC)           ED Disposition       ED Disposition   Admit    Condition   Stable    Date/Time   Sun Mar 31, 2024  1:30 PM    Comment   Case was discussed with petros zacarias and the patient's admission status was agreed to be Admission Status: inpatient status to the service of Dr. zacarias .               Follow-up Information    None         Patient's Medications   Discharge  Prescriptions    No medications on file       No discharge procedures on file.    PDMP Review       None            ED Provider  Electronically Signed by             Chilo Hernández PA-C  03/31/24 1356       Missy Gutierrez DO  03/31/24 1413

## 2024-03-31 NOTE — ASSESSMENT & PLAN NOTE
TME r/o intracranial path  Would favor CTH prior to initiating VTE anticoagulation. Will initiate anticoag as GOC discussion pending  Nonfocal  Neuro checks  Check tsh/ammonia

## 2024-04-01 PROBLEM — D72.829 LEUKOCYTOSIS: Status: RESOLVED | Noted: 2024-03-31 | Resolved: 2024-04-01

## 2024-04-01 PROBLEM — R73.9 HYPERGLYCEMIA: Status: RESOLVED | Noted: 2024-03-31 | Resolved: 2024-04-01

## 2024-04-01 PROBLEM — R74.01 TRANSAMINITIS: Status: RESOLVED | Noted: 2024-03-31 | Resolved: 2024-04-01

## 2024-04-01 PROBLEM — D64.9 ANEMIA: Chronic | Status: RESOLVED | Noted: 2024-03-15 | Resolved: 2024-04-01

## 2024-04-01 PROBLEM — E66.9 OBESITY, CLASS I, BMI 30-34.9: Chronic | Status: RESOLVED | Noted: 2024-03-13 | Resolved: 2024-04-01

## 2024-04-01 LAB
ATRIAL RATE: 93 BPM
EST. AVERAGE GLUCOSE BLD GHB EST-MCNC: 108 MG/DL
HBA1C MFR BLD: 5.4 %
P AXIS: 62 DEGREES
PR INTERVAL: 150 MS
QRS AXIS: 42 DEGREES
QRSD INTERVAL: 88 MS
QT INTERVAL: 354 MS
QTC INTERVAL: 440 MS
T WAVE AXIS: 43 DEGREES
VENTRICULAR RATE: 93 BPM

## 2024-04-01 PROCEDURE — 99232 SBSQ HOSP IP/OBS MODERATE 35: CPT | Performed by: FAMILY MEDICINE

## 2024-04-01 RX ADMIN — HYDROMORPHONE HYDROCHLORIDE 0.5 MG: 1 INJECTION, SOLUTION INTRAMUSCULAR; INTRAVENOUS; SUBCUTANEOUS at 09:57

## 2024-04-01 RX ADMIN — HYDROMORPHONE HYDROCHLORIDE 0.5 MG: 1 INJECTION, SOLUTION INTRAMUSCULAR; INTRAVENOUS; SUBCUTANEOUS at 04:56

## 2024-04-01 NOTE — UTILIZATION REVIEW
Initial Clinical Review    Admission: Date/Time/Statement:   Admission Orders (From admission, onward)       Ordered        03/31/24 1332  INPATIENT ADMISSION  Once            Pending  INPATIENT ADMISSION  Once                          Orders Placed This Encounter   Procedures    INPATIENT ADMISSION     Standing Status:   Standing     Number of Occurrences:   1     Order Specific Question:   Level of Care     Answer:   Med Surg [16]     Order Specific Question:   Estimated length of stay     Answer:   More than 2 Midnights     Order Specific Question:   Certification     Answer:   I certify that inpatient services are medically necessary for this patient for a duration of greater than two midnights. See H&P and MD Progress Notes for additional information about the patient's course of treatment.     ED Arrival Information       Expected   -    Arrival   3/31/2024 09:44    Acuity   Emergent              Means of arrival   Ambulance    Escorted by   [facility]    Service   Hospitalist    Admission type   Emergency              Arrival complaint   respiratory distress             Chief Complaint   Patient presents with    Respiratory Distress     From [facility] for further evaluation of low O2 sat. Patient arrives via BLS on NRB. Patient with recent hospitalization follow hip fx/replacement, on Lovenox.        Initial Presentation: 83 y.o. female to ED via EMS from NH  Present to ED with SOB. Per  pt has been bedbound not participating in ambulation/PT at NH with poor oral intake since discharge. Recent L hip fx 3/13 discharge to NH 3/15   Admitted to [facility] with DX: Acute pulmonary embolism with acute cor pulmonale   on exam: tachypnic / hypoxic in resp distress on NRB @ 15 sat 85% - placed on bipap - FM sat 91%; RV/LV 1.4 . Trop 1835. Increased LFT's. pH 7.482; PCO2 29.2; PO2 60.6; HCO3 21.4; ECHO with RV dilation    CTA chest revealed b/l PE large on right with near complete occlusion of R main pulm  artery and sm on left with clot into ALMA and LL lobes.   PLAN: cont heparin gtt; cont Bipap 12/6 50% - continuous pulse ox; f/u LE duplex; titrate O2; neuro checks; f/u TSH / ammonia; Accuchecks with ssic; f/u blood / urine cx    After discussing overall decline post hip fracture with  decision was made to hold on aggressive tx, initiate hep gtt and have further goc discussion with daughter at bedside understanding that pt could decompensate in the meantime.  discussion held with daughter, , granddaughter, and . Will transition to comfort measure only.      Date: 4/1/24      Day 2  When under comfort care, follow hospice care referral, case management on board       ED Triage Vitals   Temperature Pulse Respirations Blood Pressure SpO2   03/31/24 0945 03/31/24 0945 03/31/24 0945 03/31/24 0945 03/31/24 0945   98.6 °F (37 °C) 94 (!) 36 106/78 (!) 85 %      Temp Source Heart Rate Source Patient Position - Orthostatic VS BP Location FiO2 (%)   03/31/24 1439 03/31/24 0945 03/31/24 0945 03/31/24 0945 03/31/24 1008   Temporal Monitor Lying Right arm 60      Pain Score       03/31/24 0945       No Pain          Wt Readings from Last 1 Encounters:   03/31/24 81.9 kg (180 lb 8.9 oz)     Additional Vital Signs:   Date/Time Temp Pulse Resp BP MAP (mmHg) SpO2 FiO2 (%) O2 Flow Rate (L/min) O2 Device O2 Interface Device Patient Position - Orthostatic VS   04/01/24 0803 -- 84 27 Abnormal  -- -- -- -- -- -- -- --   03/31/24 2238 -- 91 32 Abnormal  -- -- -- -- -- -- -- --   03/31/24 1932 -- -- -- -- -- -- -- -- None (Room air) -- --   03/31/24 1600 -- -- -- -- -- 67 % Abnormal  -- -- None (Room air) -- --   03/31/24 1439 97.9 °F (36.6 °C) 96 22 125/88 94 58 % Abnormal  -- -- None (Room air) -- Lying   03/31/24 1400 -- 91 22 -- -- 67 % Abnormal  -- -- None (Room air) -- --   03/31/24 1330 -- 82 27 Abnormal  117/62 84 95 % -- -- BiPAP -- Lying   03/31/24 1300 -- 89 32 Abnormal  151/83 107 96 % -- -- BiPAP -- Lying  "  03/31/24 1230 -- 87 31 Abnormal  150/79 108 95 % -- -- BiPAP -- Lying   03/31/24 1200 -- 86 28 Abnormal  140/76 102 95 % -- -- BiPAP -- Lying   03/31/24 1130 -- 85 30 Abnormal  130/66 90 94 % -- -- BiPAP -- --   03/31/24 1100 -- 84 31 Abnormal  168/90 108 96 % -- -- BiPAP -- --   03/31/24 1030 -- 91 34 Abnormal  140/66 95 94 % -- -- BiPAP -- --   03/31/24 1015 -- 94 38 Abnormal  149/66 95 93 % -- -- BiPAP -- --   03/31/24 1014 -- 94 38 Abnormal  163/85 114 93 % -- -- BiPAP -- --   03/31/24 1008 -- -- -- -- -- 93 % 60 -- BiPAP Face mask --   03/31/24 1000 -- 96 45 Abnormal  163/85 87 91 % -- -- -- -- --   03/31/24 0945 98.6 °F (37 °C) 94 36 Abnormal  106/78 -- 85 % Abnormal  -- 15 L/min Non-rebreather mask -- Lying       EKG: Normal sinus rhythm  Anterior infarct (cited on or before 12-MAR-2024)  Abnormal ECG  When compared with ECG of 12-MAR-2024 19:46,  No significant change was found      Pertinent Labs/Diagnostic Test Results:   PE Study with CT Abdomen and Pelvis with contrast   Final Result by Samy Cannon MD (03/31 1150)         1. Acute bilateral pulmonary embolism, large on the right with nearly complete occlusion of the right main pulmonary artery and small on the left with clot arborizing into upper and lower lobe branches.      The calculated ratio of right ventricular to left ventricular diameter (RV/LV ratio) is 1.4.      There is a critical finding of acute PE with RV/LV ratio >0.9. Based on PERT algorithm recommendations:    \"  Order STAT biomarkers including troponin, NT-BNP or BNP    \"  Calculate PESI score:   o  If PESI score falls in I-III, with negative biomarkers, please order a PERT priority ECHO.   o  If PESI score falls in IV-V or with positive biomarkers, please order STAT ECHO and alert the campus PERT via PAC at [phone].         2. Recent postoperative changes reflecting ORIF left femoral intertrochanteric fracture.   3. Additional findings as noted.            I " personally discussed this study with ERICA CALLES on 3/31/2024 11:48 AM.            Workstation performed: [id number]         XR chest portable   Final Result by Gia Lance MD (03/31 1443)      No acute cardiopulmonary disease.            Workstation performed: [id number]           Results from last 7 days   Lab Units 03/31/24  0956   SARS-COV-2  Negative     Results from last 7 days   Lab Units 03/31/24  0956   WBC Thousand/uL 11.81*   HEMOGLOBIN g/dL 11.5   HEMATOCRIT % 37.5   PLATELETS Thousands/uL 440*   NEUTROS ABS Thousands/µL 10.15*        Results from last 7 days   Lab Units 03/31/24  0956   SODIUM mmol/L 141   POTASSIUM mmol/L 4.7   CHLORIDE mmol/L 107   CO2 mmol/L 24   ANION GAP mmol/L 10   BUN mg/dL 26*   CREATININE mg/dL 0.78   EGFR ml/min/1.73sq m 71   CALCIUM mg/dL 9.4   MAGNESIUM mg/dL 2.1     Results from last 7 days   Lab Units 03/31/24  0956   AST U/L 70*   ALT U/L 60*   ALK PHOS U/L 119*   TOTAL PROTEIN g/dL 7.0   ALBUMIN g/dL 3.4*   TOTAL BILIRUBIN mg/dL 0.49        Results from last 7 days   Lab Units 03/31/24  0956   GLUCOSE RANDOM mg/dL 227*        Results from last 7 days   Lab Units 03/31/24  1010   HEMOGLOBIN A1C % 5.4   EAG mg/dl 108        Results from last 7 days   Lab Units 03/31/24  1002   PH ART  7.482*   PCO2 ART mm Hg 29.2*   PO2 ART mm Hg 60.6*   HCO3 ART mmol/L 21.4*   BASE EXC ART mmol/L -1.1   O2 CONTENT ART mL/dL 15.9*   O2 HGB, ARTERIAL % 89.9*   ABG SOURCE  Radial, Left        Results from last 7 days   Lab Units 03/31/24  1154 03/31/24  0956   HS TNI 0HR ng/L  --  1,460*   HS TNI 2HR ng/L 1,835*  --    HSTNI D2 ng/L 375*  --          Results from last 7 days   Lab Units 03/31/24  0956   PROTIME seconds 15.7*   INR  1.22*   PTT seconds 25     Results from last 7 days   Lab Units 03/31/24  0956   TSH 3RD GENERATON uIU/mL 1.221     Results from last 7 days   Lab Units 03/31/24  0956   PROCALCITONIN ng/ml 0.16     Results from last 7 days   Lab Units  03/31/24  1242 03/31/24  0956   LACTIC ACID mmol/L 1.5 2.1*        Results from last 7 days   Lab Units 03/31/24  0956   BNP pg/mL 349*        Results from last 7 days   Lab Units 03/31/24  0956   CLARITY UA  Clear   COLOR UA  Yellow   SPEC GRAV UA  >=1.030   PH UA  5.5   GLUCOSE UA mg/dl 100 (1/10%)*   KETONES UA mg/dl Trace*   BLOOD UA  Small*   PROTEIN UA mg/dl Trace*   NITRITE UA  Negative   BILIRUBIN UA  Small*   UROBILINOGEN UA E.U./dl 0.2   LEUKOCYTES UA  Negative   WBC UA /hpf 0-5   RBC UA /hpf 0-1   BACTERIA UA /hpf Moderate*   EPITHELIAL CELLS WET PREP /hpf Occasional   MUCUS THREADS  Moderate*     Results from last 7 days   Lab Units 03/31/24  0956   INFLUENZA A PCR  Negative   INFLUENZA B PCR  Negative   RSV PCR  Negative      Results from last 7 days   Lab Units 03/31/24  0956 03/31/24  0955   BLOOD CULTURE  Received in Microbiology Lab. Culture in Progress. Received in Microbiology Lab. Culture in Progress.          ED Treatment:   Medication Administration from 03/31/2024 0944 to 03/31/2024 1439         Date/Time Order Dose Route Action     03/31/2024 1054 EDT iohexol (OMNIPAQUE) 350 MG/ML injection (MULTI-DOSE) 100 mL 100 mL Intravenous Given     03/31/2024 1138 EDT heparin (porcine) injection 6,400 Units 6,400 Units Intravenous Given     03/31/2024 1138 EDT heparin (porcine) 25,000 units in 0.45% NaCl 250 mL infusion (premix) 18 Units/kg/hr Intravenous New Bag     03/31/2024 1334 EDT HYDROmorphone (DILAUDID) injection 0.5 mg 0.5 mg Intravenous Given            Present on Admission:   (Resolved) Obesity, Class I, 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 female    Admission Orders: SCDs; regular diet / pleasure feeds; EOL care / comfort measures    Scheduled Medications: None     Continuous IV Infusions: heparin (porcine) 25,000 units in 0.45% NaCl 250 mL infusion (premix)         PRN Meds:  bisacodyl, 10 mg, Rectal, Daily PRN  glycopyrrolate, 0.1 mg, Intravenous, Q4H PRN  HYDROmorphone, 0.5 mg, Intravenous, Q15 Min PRN  (3/31 rec'd x1)   (4/1 rec'd x1 so far today)   LORazepam, 1 mg, Intravenous, Q10 Min PRN        IP CONSULT TO HOSPICE    Network Utilization Review Department  ATTENTION: Please call with any questions or concerns to [phone] and carefully listen to the prompts so that you are directed to the right person. All voicemails are confidential.   For Discharge needs, contact Care Management DC Support Team at [phone] opt. [phone]  Send all requests for admission clinical reviews, approved or denied determinations and any other requests to dedicated fax number below belonging to the campus where the patient is receiving treatment. List of dedicated fax numbers for the Facilities:  FACILITY NAME UR FAX NUMBER   ADMISSION DENIALS (Administrative/Medical Necessity) [phone]   DISCHARGE SUPPORT TEAM (NETWORK) [phone]   PARENT CHILD HEALTH (Maternity/NICU/Pediatrics) [phone]   [facility] [phone]   [facility] [phone]   [facility] [phone]   [facility] [phone]   [facility] [phone]   [facility] [phone]   [facility] [phone]   [facility] [phone]   [facility] [phone]   [facility] [phone]   [facility] [phone]   [facility]  [facility] [phone]

## 2024-04-01 NOTE — PLAN OF CARE
Problem: Potential for Falls  Goal: Patient will remain free of falls  Description: INTERVENTIONS:  - Educate patient/family on patient safety including physical limitations  - Instruct patient to call for assistance with activity   - Consult OT/PT to assist with strengthening/mobility   - Keep Call bell within reach  - Keep bed low and locked with side rails adjusted as appropriate  - Keep care items and personal belongings within reach  - Initiate and maintain comfort rounds  - Make Fall Risk Sign visible to staff  - Offer Toileting every 2 Hours, in advance of need  - Initiate/Maintain bed alarm  - Obtain necessary fall risk management equipment: yellow socks, yellow wristband  - Apply yellow socks and bracelet for high fall risk patients  - Consider moving patient to room near nurses station  Outcome: Progressing     Problem: Prexisting or High Potential for Compromised Skin Integrity  Goal: Skin integrity is maintained or improved  Description: INTERVENTIONS:  - Identify patients at risk for skin breakdown  - Assess and monitor skin integrity  - Assess and monitor nutrition and hydration status  - Monitor labs   - Assess for incontinence   - Turn and reposition patient  - Assist with mobility/ambulation  - Relieve pressure over bony prominences  - Avoid friction and shearing  - Provide appropriate hygiene as needed including keeping skin clean and dry  - Evaluate need for skin moisturizer/barrier cream  - Collaborate with interdisciplinary team   - Patient/family teaching  - Consider wound care consult   Outcome: Progressing      98.2

## 2024-04-01 NOTE — ASSESSMENT & PLAN NOTE
2/2 R heart strain     Critical care doctor and team discussed with family member regarding goals of care, after discussion is done, family chose comfort care.  When under comfort care, follow hospice care referral, case management on board

## 2024-04-01 NOTE — PROGRESS NOTES
[facility]  Progress Note  Name: Madalyn Dhaliwal I  MRN: [id number]  Unit/Bed#: -01 I Date of Admission: 3/31/2024   Date of Service: 4/1/2024 I Hospital Day: 1    Assessment/Plan   Acute respiratory failure with hypoxia (HCC)  Assessment & Plan  2/2 submassive pe with cor pulmonale  Bipap 12/6 50%  ABG mild resp alkalosis  Pending GOC consider HFNC  Wean for sats>92%  Critical care doctor and team discussed with family member regarding goals of care, after discussion is done, family chose comfort care.  When under comfort care, follow hospice care referral, case management on board    * Acute pulmonary embolism with acute cor pulmonale (HCC)  Assessment & Plan  CTA-Acute bilateral pulmonary embolism, large on the right with nearly complete occlusion of the right main pulmonary artery and small on the left with clot arborizing into upper and lower lobe branches. RV/LV 1.4    [id number]  POC ECHO with RV dilation  Heparin gtt VTE  No TNK with stable hemodynamics/recent L hip surgery  GOC conversations given bedbound status since hip surgery with poor oral intake. Daughter coming to hospital to further discuss with .  Critical care doctor and team discussed with family member regarding goals of care, after discussion is done, family chose comfort care.  When under comfort care, follow hospice care referral, case management on board      Comfort measures only status  Assessment & Plan  Critical care doctor and team discussed with family member regarding goals of care, after discussion is done, family chose comfort care.  When under comfort care, follow hospice care referral, case management on board    Elevated troponin  Assessment & Plan  2/2 R heart strain     Critical care doctor and team discussed with family member regarding goals of care, after discussion is done, family chose comfort care.  When under comfort care, follow hospice care referral, case management  on board    Toxic encephalopathy  Assessment & Plan  TME r/o intracranial path    Critical care doctor and team discussed with family member regarding goals of care, after discussion is done, family chose comfort care.  When under comfort care, follow hospice care referral, case management on board    Intertrochanteric fracture proximal left femur, subsequent encounter, routine healing  Assessment & Plan  S/p L IM nail 3/13  D/c 3/15 to NH  Enoxaparin therapeutic changed to hep gtt with PE           VTE Pharmacologic Prophylaxis:    Patient is under comfort care    Mobility:   -M Achieved: 2: Bed activities/Dependent transfer  Patient remain under comfort care    Patient Centered Rounds: I performed bedside rounds with nursing staff today.   Discussions with Specialists or Other Care Team Provider: Critical care note reviewed    Education and Discussions with Family / Patient: Updated  ( and daughter) at bedside.    Current Length of Stay: 1 day(s)  Current Patient Status: Inpatient   Certification Statement: The patient will continue to require additional inpatient hospital stay due to monitor above conditions  Discharge Plan: Anticipate discharge in 24-48 hrs to to be determined    Code Status: Level 4 - Comfort Care    Subjective:   Seen and evaluated during the event.  Patient remain upright position, looks comfortable.    Objective:     Vitals:   Temp (24hrs), Av.9 °F (36.6 °C), Min:97.9 °F (36.6 °C), Max:97.9 °F (36.6 °C)    Temp:  [97.9 °F (36.6 °C)] 97.9 °F (36.6 °C)  HR:  [82-96] 84  Resp:  [22-32] 27  BP: (117-168)/(62-90) 125/88  SpO2:  [58 %-96 %] 67 %  Body mass index is 35.26 kg/m².     Input and Output Summary (last 24 hours):     Intake/Output Summary (Last 24 hours) at 2024 1056  Last data filed at 3/31/2024 1332  Gross per 24 hour   Intake 27.36 ml   Output --   Net 27.36 ml       Physical Exam:   Physical Exam  Vitals and nursing note reviewed.   Constitutional:        Appearance: She is ill-appearing. She is not diaphoretic.   Eyes:      General: No scleral icterus.     Pupils: Pupils are equal, round, and reactive to light.   Cardiovascular:      Rate and Rhythm: Normal rate.      Heart sounds:      No friction rub. No gallop.   Pulmonary:      Effort: No respiratory distress.      Breath sounds: No stridor. No wheezing or rhonchi.   Neurological:      Mental Status: She is alert.          Additional Data:     Labs:  Results from last 7 days   Lab Units 03/31/24  0956   WBC Thousand/uL 11.81*   HEMOGLOBIN g/dL 11.5   HEMATOCRIT % 37.5   PLATELETS Thousands/uL 440*   NEUTROS PCT % 85*   LYMPHS PCT % 10*   MONOS PCT % 4   EOS PCT % 0     Results from last 7 days   Lab Units 03/31/24  0956   SODIUM mmol/L 141   POTASSIUM mmol/L 4.7   CHLORIDE mmol/L 107   CO2 mmol/L 24   BUN mg/dL 26*   CREATININE mg/dL 0.78   ANION GAP mmol/L 10   CALCIUM mg/dL 9.4   ALBUMIN g/dL 3.4*   TOTAL BILIRUBIN mg/dL 0.49   ALK PHOS U/L 119*   ALT U/L 60*   AST U/L 70*   GLUCOSE RANDOM mg/dL 227*     Results from last 7 days   Lab Units 03/31/24  0956   INR  1.22*         Results from last 7 days   Lab Units 03/31/24  1010   HEMOGLOBIN A1C % 5.4     Results from last 7 days   Lab Units 03/31/24  1242 03/31/24  0956   LACTIC ACID mmol/L 1.5 2.1*   PROCALCITONIN ng/ml  --  0.16       Lines/Drains:  Invasive Devices       Peripheral Intravenous Line  Duration             Peripheral IV 03/31/24 Left Antecubital 1 day    Peripheral IV 03/31/24 Right Antecubital 1 day              Drain  Duration             Urethral Catheter Straight-tip 18 Fr. 1 day                  Urinary Catheter:  Goal for removal: N/A - Chronic Aguila               Imaging: No pertinent imaging reviewed.    Recent Cultures (last 7 days):   Results from last 7 days   Lab Units 03/31/24  0956 03/31/24  0955   BLOOD CULTURE  Received in Microbiology Lab. Culture in Progress. Received in Microbiology Lab. Culture in Progress.       Last  24 Hours Medication List:   Current Facility-Administered Medications   Medication Dose Route Frequency Provider Last Rate    bisacodyl  10 mg Rectal Daily PRN POLINA Foy      glycopyrrolate  0.1 mg Intravenous Q4H PRN POLINA Foy      HYDROmorphone  0.5 mg Intravenous Q15 Min PRN POLINA Foy      LORazepam  1 mg Intravenous Q10 Min PRN POLINA Foy          Today, Patient Was Seen By: Dixon Zacarias MD    **Please Note: This note may have been constructed using a voice recognition system.**

## 2024-04-01 NOTE — ASSESSMENT & PLAN NOTE
TME r/o intracranial path    Critical care doctor and team discussed with family member regarding goals of care, after discussion is done, family chose comfort care.  When under comfort care, follow hospice care referral, case management on board

## 2024-04-01 NOTE — CASE MANAGEMENT
Case Management Progress Note    Patient name Madalyn Dhaliwal  Location /-01 MRN [id number]  : 1940 Date 2024       LOS (days): 1  Geometric Mean LOS (GMLOS) (days):   Days to GMLOS:        OBJECTIVE:        Current admission status: Inpatient  Preferred Pharmacy:   [facility] - [address] [address]  Phone: [phone] Fax: [phone]    Primary Care Provider: Esteban Jj MD    Primary Insurance: [employer]  Secondary Insurance: MEDICARE    PROGRESS NOTE:    Patient with hip fracture, dc to [facility] on 3/15/24.      Admit back to [facility], on comfort care.  CM to assist with dc planning.      Met with family and offered choices:  Home with hospice- with family support   SNF - return [facility] (473 daily) with [facility]   Hospice House - only if patient qualifies, [facility].  Brochures for [facility] and [facility] given to patient.  CM will follow up shortly after family has time to discuss options.     UPDATE 1130  Family prefers referral to [facility].  Referral makearaseli contacted.  They will reach out to family shortly to discuss options.  CM closely following to coordinate dc.      Anticipate home vs [facility], [facility] consulted.   Carissa point of contact/dtr [phone]  Spouse Brigido, [phone]    UPDATE 1430  Family meeting in conference room.  Discussed hospice option, all questions answered.  Preference of spouse, family, patient is to dc home with [facility].      Call to [facility].  Requested delivery of DME to home address of [address] [place] [address].  This will be delivered no later than noon tomorrow.  CM will request BLS transport into home tomorrow 1330.  Provider updated and in agreement with dc plan.     DCP: [facility] at home.   BLS  requested for 1330 on .      [facility] claimed the ride for Madalyn Dhaliwal in unit/room   bed -01, and will arrive on 04/02/2024 at 1:30pm EDT. Contact them at [phone].

## 2024-04-01 NOTE — ASSESSMENT & PLAN NOTE
Critical care doctor and team discussed with family member regarding goals of care, after discussion is done, family chose comfort care.  When under comfort care, follow hospice care referral, case management on board

## 2024-04-01 NOTE — ASSESSMENT & PLAN NOTE
CTA-Acute bilateral pulmonary embolism, large on the right with nearly complete occlusion of the right main pulmonary artery and small on the left with clot arborizing into upper and lower lobe branches. RV/LV 1.4    [id number]  POC ECHO with RV dilation  Heparin gtt VTE  No TNK with stable hemodynamics/recent L hip surgery  GOC conversations given bedbound status since hip surgery with poor oral intake. Daughter coming to hospital to further discuss with .  Critical care doctor and team discussed with family member regarding goals of care, after discussion is done, family chose comfort care.  When under comfort care, follow hospice care referral, case management on board

## 2024-04-01 NOTE — UTILIZATION REVIEW
NOTIFICATION OF INPATIENT ADMISSION   AUTHORIZATION REQUEST   SERVICING FACILITY:   [place] [address]  Tax ID: [id number]  NPI: [id number] ATTENDING PROVIDER:  Attending Name and NPI#: Dixon Zacarias Md [[id number]]  Address: [address] [place] [address]  Phone: [phone]   ADMISSION INFORMATION:  Place of Service: Inpatient [facility]  Place of Service Code: 21  Inpatient Admission Date/Time: 3/31/24  1:33 PM  Discharge Date/Time: No discharge date for patient encounter.  Admitting Diagnosis Code/Description:  Respiratory distress [R06.03]  Acute pulmonary embolism with acute cor pulmonale, unspecified pulmonary embolism type (HCC) [I26.09]     UTILIZATION REVIEW CONTACT:  Sri Pablo Utilization   Network Utilization Review Department  Phone: [phone]  Fax [phone]  Email: [email or web address]  Contact for approvals/pending authorizations, clinical reviews, and discharge.     PHYSICIAN ADVISORY SERVICES:  Medical Necessity Denial & Zfdx-jp-Ghvp Review  Phone: [phone]  Fax: [phone]  Email: [email or web address]     DISCHARGE SUPPORT TEAM:  For Patients Discharge Needs & Updates  Phone: [phone] opt. 2 Fax: [phone]  Email: [email or web address]

## 2024-04-01 NOTE — PROGRESS NOTES
Patient:    MRN:  [id number]    Aidin Request ID:  [id number]    Level of care reserved:  Hospice    Partner Reserved:  [facility], [place] [address] [phone]    Clinical needs requested:    Geography searched:  [id number]    Start of Service:    Request sent:  11:26am EDT on 4/1/2024 by Mellisa Coker    Partner reserved:  3:26pm EDT on 4/1/2024 by Mellisa Coker    Choice list shared:  3:26pm EDT on 4/1/2024 by Mellisa Coker

## 2024-04-01 NOTE — DISCHARGE INSTR - OTHER ORDERS
[facility] will be providing your care at home.   Please call them directly with any questions or concerns you may have.   Phone: [phone]

## 2024-04-02 VITALS
BODY MASS INDEX: 35.45 KG/M2 | WEIGHT: 180.56 LBS | TEMPERATURE: 97.9 F | SYSTOLIC BLOOD PRESSURE: 125 MMHG | HEIGHT: 60 IN | DIASTOLIC BLOOD PRESSURE: 88 MMHG | OXYGEN SATURATION: 67 % | RESPIRATION RATE: 26 BRPM | HEART RATE: 91 BPM

## 2024-04-02 PROCEDURE — 99239 HOSP IP/OBS DSCHRG MGMT >30: CPT | Performed by: FAMILY MEDICINE

## 2024-04-02 RX ORDER — BISACODYL 10 MG
10 SUPPOSITORY, RECTAL RECTAL DAILY PRN
Qty: 12 SUPPOSITORY | Refills: 0
Start: 2024-04-02

## 2024-04-02 RX ADMIN — HYDROMORPHONE HYDROCHLORIDE 0.5 MG: 1 INJECTION, SOLUTION INTRAMUSCULAR; INTRAVENOUS; SUBCUTANEOUS at 12:33

## 2024-04-02 NOTE — PLAN OF CARE
Problem: Potential for Falls  Goal: Patient will remain free of falls  Description: INTERVENTIONS:  - Educate patient/family on patient safety including physical limitations  - Instruct patient to call for assistance with activity   - Consult OT/PT to assist with strengthening/mobility   - Keep Call bell within reach  Problem: Potential for Falls  Goal: Patient will remain free of falls  Description: INTERVENTIONS:  - Educate patient/family on patient safety including physical limitations  - Instruct patient to call for assistance with activity   - Consult OT/PT to assist with strengthening/mobility   - Keep Call bell within reach  - Keep bed low and locked with side rails adjusted as appropriate  - Keep care items and personal belongings within reach  - Initiate and maintain comfort rounds  - Make Fall Risk Sign visible to staff  - Offer Toileting every 2 Hours, in advance of need  - Initiate/Maintain bed alarm  - Obtain necessary fall risk management equipment:   - Apply yellow socks and bracelet for high fall risk patients  - Consider moving patient to room near nurses station  4/1/2024 2047 by Hamida Longoria RN  Outcome: Progressing  4/1/2024 2047 by Hamida Longoria RN  Outcome: Progressing     Problem: Prexisting or High Potential for Compromised Skin Integrity  Goal: Skin integrity is maintained or improved  Description: INTERVENTIONS:  - Identify patients at risk for skin breakdown  - Assess and monitor skin integrity  - Assess and monitor nutrition and hydration status  - Monitor labs   - Assess for incontinence   - Turn and reposition patient  - Assist with mobility/ambulation  - Relieve pressure over bony prominences  - Avoid friction and shearing  - Provide appropriate hygiene as needed including keeping skin clean and dry  - Evaluate need for skin moisturizer/barrier cream  - Collaborate with interdisciplinary team   - Patient/family teaching  - Consider wound care consult   4/1/2024 2047 by  Hamida Longoria RN  Outcome: Progressing  4/1/2024 2047 by Hamida Longoria RN  Outcome: Progressing     Problem: PAIN - ADULT  Goal: Verbalizes/displays adequate comfort level or baseline comfort level  Description: Interventions:  - Encourage patient to monitor pain and request assistance  - Assess pain using appropriate pain scale  - Administer analgesics based on type and severity of pain and evaluate response  - Implement non-pharmacological measures as appropriate and evaluate response  - Consider cultural and social influences on pain and pain management  - Notify physician/advanced practitioner if interventions unsuccessful or patient reports new pain  Outcome: Progressing     Problem: INFECTION - ADULT  Goal: Absence or prevention of progression during hospitalization  Description: INTERVENTIONS:  - Assess and monitor for signs and symptoms of infection  - Monitor lab/diagnostic results  - Monitor all insertion sites, i.e. indwelling lines, tubes, and drains  - Monitor endotracheal if appropriate and nasal secretions for changes in amount and color  - [place] appropriate cooling/warming therapies per order  - Administer medications as ordered  - Instruct and encourage patient and family to use good hand hygiene technique  - Identify and instruct in appropriate isolation precautions for identified infection/condition  Outcome: Progressing  Goal: Absence of fever/infection during neutropenic period  Description: INTERVENTIONS:  - Monitor WBC    Outcome: Progressing     Problem: SAFETY ADULT  Goal: Patient will remain free of falls  Description: INTERVENTIONS:  - Educate patient/family on patient safety including physical limitations  - Instruct patient to call for assistance with activity   - Consult OT/PT to assist with strengthening/mobility   - Keep Call bell within reach  - Keep bed low and locked with side rails adjusted as appropriate  - Keep care items and personal belongings within reach  -  Initiate and maintain comfort rounds  - Make Fall Risk Sign visible to staff  - Offer Toileting every 2 Hours, in advance of need  - Initiate/Maintain bed alarm  - Obtain necessary fall risk management equipment:   - Apply yellow socks and bracelet for high fall risk patients  - Consider moving patient to room near nurses station  4/1/2024 2047 by Hamida Longoria RN  Outcome: Progressing  4/1/2024 2047 by Hamida Longoria RN  Outcome: Progressing  Goal: Maintain or return to baseline ADL function  Description: INTERVENTIONS:  -  Assess patient's ability to carry out ADLs; assess patient's baseline for ADL function and identify physical deficits which impact ability to perform ADLs (bathing, care of mouth/teeth, toileting, grooming, dressing, etc.)  - Assess/evaluate cause of self-care deficits   - Assess range of motion  - Assess patient's mobility; develop plan if impaired  - Assess patient's need for assistive devices and provide as appropriate  - Encourage maximum independence but intervene and supervise when necessary  - Involve family in performance of ADLs  - Assess for home care needs following discharge   - Consider OT consult to assist with ADL evaluation and planning for discharge  - Provide patient education as appropriate  Outcome: Progressing  Goal: Maintains/Returns to pre admission functional level  Description: INTERVENTIONS:  - Perform AM-PAC 6 Click Basic Mobility/ Daily Activity assessment daily.  - Set and communicate daily mobility goal to care team and patient/family/caregiver.   - Collaborate with rehabilitation services on mobility goals if consulted  - Perform Range of Motion 3 times a day.  - Reposition patient every 2 hours.  - Dangle patient 3 times a day  - Stand patient 3 times a day  - Ambulate patient 3 times a day  - Out of bed to chair 3 times a day   - Out of bed for meals 3 times a day  - Out of bed for toileting  - Record patient progress and toleration of activity level    Outcome: Progressing     Problem: DISCHARGE PLANNING  Goal: Discharge to home or other facility with appropriate resources  Description: INTERVENTIONS:  - Identify barriers to discharge w/patient and caregiver  - Arrange for needed discharge resources and transportation as appropriate  - Identify discharge learning needs (meds, wound care, etc.)  - Arrange for interpretive services to assist at discharge as needed  - Refer to Case Management Department for coordinating discharge planning if the patient needs post-hospital services based on physician/advanced practitioner order or complex needs related to functional status, cognitive ability, or social support system  Outcome: Progressing     Problem: Knowledge Deficit  Goal: Patient/family/caregiver demonstrates understanding of disease process, treatment plan, medications, and discharge instructions  Description: Complete learning assessment and assess knowledge base.  Interventions:  - Provide teaching at level of understanding  - Provide teaching via preferred learning methods  Outcome: Progressing     - Keep bed low and locked with side rails adjusted as appropriate  - Keep care items and personal belongings within reach  - Initiate and maintain comfort rounds  - Make Fall Risk Sign visible to staff  - Offer Toileting every 2 Hours, in advance of need  - Initiate/Maintain bed alarm  - Obtain necessary fall risk management equipment:   - Apply yellow socks and bracelet for high fall risk patients  - Consider moving patient to room near nurses station  Outcome: Progressing     Problem: Prexisting or High Potential for Compromised Skin Integrity  Goal: Skin integrity is maintained or improved  Description: INTERVENTIONS:  - Identify patients at risk for skin breakdown  - Assess and monitor skin integrity  - Assess and monitor nutrition and hydration status  - Monitor labs   - Assess for incontinence   - Turn and reposition patient  - Assist with  mobility/ambulation  - Relieve pressure over bony prominences  - Avoid friction and shearing  - Provide appropriate hygiene as needed including keeping skin clean and dry  - Evaluate need for skin moisturizer/barrier cream  - Collaborate with interdisciplinary team   - Patient/family teaching  - Consider wound care consult   Outcome: Progressing

## 2024-04-02 NOTE — PLAN OF CARE
Problem: Potential for Falls  Goal: Patient will remain free of falls  Description: INTERVENTIONS:  - Educate patient/family on patient safety including physical limitations  - Instruct patient to call for assistance with activity   - Consult OT/PT to assist with strengthening/mobility   - Keep Call bell within reach  - Keep bed low and locked with side rails adjusted as appropriate  - Keep care items and personal belongings within reach  - Initiate and maintain comfort rounds  - Make Fall Risk Sign visible to staff  - Apply yellow socks and bracelet for high fall risk patients  - Consider moving patient to room near nurses station  4/2/2024 1403 by Joseline Boyd RN  Outcome: Completed  4/2/2024 0722 by Joseline Boyd RN  Outcome: Progressing     Problem: PAIN - ADULT  Goal: Verbalizes/displays adequate comfort level or baseline comfort level  Description: Interventions:  - Encourage patient to monitor pain and request assistance  - Assess pain using appropriate pain scale  - Administer analgesics based on type and severity of pain and evaluate response  - Implement non-pharmacological measures as appropriate and evaluate response  - Consider cultural and social influences on pain and pain management  - Notify physician/advanced practitioner if interventions unsuccessful or patient reports new pain  4/2/2024 1403 by Joseline Boyd RN  Outcome: Completed  4/2/2024 0722 by Joseline Boyd RN  Outcome: Progressing

## 2024-04-02 NOTE — CASE MANAGEMENT
Case Management Discharge Planning Note    Patient name Madalyn Dhaliwal  Location /-01 MRN [id number]  : 1940 Date 2024       Current Admission Date: 3/31/2024  Current Admission Diagnosis:Acute pulmonary embolism with acute cor pulmonale (HCC)   Patient Active Problem List    Diagnosis Date Noted    Acute pulmonary embolism with acute cor pulmonale (HCC) 2024    Acute respiratory failure with hypoxia (HCC) 2024    Elevated troponin 2024    Comfort measures only status 2024    Toxic encephalopathy 2024    Intertrochanteric fracture proximal left femur, subsequent encounter, routine healing 2024    Fall from standing 2024      LOS (days): 2  Geometric Mean LOS (GMLOS) (days):   Days to GMLOS:     OBJECTIVE:  Risk of Unplanned Readmission Score: 12.7         Current admission status: Inpatient   Preferred Pharmacy:   [facility] [place] [address]  Phone: [phone] Fax: [phone]    Primary Care Provider: Esteban Jj MD    Primary Insurance: [employer]  Secondary Insurance: MEDICARE    DISCHARGE DETAILS:    Discharge planning discussed with:: Pt, spouse, dtr  Freedom of Choice: Yes  Comments - Freedom of Choice: [facility] - at home  CM contacted family/caregiver?: Yes  Were Treatment Team discharge recommendations reviewed with patient/caregiver?: Yes  Did patient/caregiver verbalize understanding of patient care needs?: Yes  Were patient/caregiver advised of the risks associated with not following Treatment Team discharge recommendations?: Yes    Contacts  Patient Contacts: Carissa Fofana  Relationship to Patient:: Family  Contact Method: In Person  Reason/Outcome: Discharge Planning    Requested Home Health Care         Is the patient interested in HHC at discharge?: No    Treatment Team Recommendation: Hospice  Discharge Destination Plan:: Hospice      Accepting Facility  Name, City & State : [facility]  Receiving Facility/Agency Phone Number: Phone: [phone]  Facility/Agency Fax Number: Fax: [phone]  Hospice RN will meet family at home for admission to services.        [facility] claimed the ride for Madalyn Dhaliwal in unit/room  bed -01, and will arrive on 04/02/2024 at 1:30pm EDT. Contact them at [phone].

## 2024-04-02 NOTE — ASSESSMENT & PLAN NOTE
2/2 submassive pe with cor pulmonale  Bipap 12/6 50%  ABG mild resp alkalosis  Was transition to comfort care and will be discharged home later today

## 2024-04-02 NOTE — ASSESSMENT & PLAN NOTE
CTA-Acute bilateral pulmonary embolism, large on the right with nearly complete occlusion of the right main pulmonary artery and small on the left with clot arborizing into upper and lower lobe branches. RV/LV 1.4    [id number]  POC ECHO with RV dilation  Heparin gtt VTE  No TNK with stable hemodynamics/recent L hip surgery  Now on hospice care

## 2024-04-02 NOTE — DISCHARGE SUMMARY
[facility]  Discharge- Madalyn Dhaliwal 1940, 83 y.o. female MRN: [id number]  Unit/Bed#: -01 Encounter: [id number]  Primary Care Provider: Esteban Jj MD   Date and time admitted to hospital: 3/31/2024  9:44 AM    * Acute pulmonary embolism with acute cor pulmonale (HCC)  Assessment & Plan  CTA-Acute bilateral pulmonary embolism, large on the right with nearly complete occlusion of the right main pulmonary artery and small on the left with clot arborizing into upper and lower lobe branches. RV/LV 1.4    [id number]  POC ECHO with RV dilation  Heparin gtt VTE  No TNK with stable hemodynamics/recent L hip surgery  Now on hospice care    Acute respiratory failure with hypoxia (HCC)  Assessment & Plan  2/2 submassive pe with cor pulmonale  Bipap 12/6 50%  ABG mild resp alkalosis  Was transition to comfort care and will be discharged home later today    Toxic encephalopathy  Assessment & Plan  Critical care doctor and team discussed with family member regarding goals of care, after discussion family chose comfort care.  When under comfort care, follow hospice care referral, case management on board    Intertrochanteric fracture proximal left femur, subsequent encounter, routine healing  Assessment & Plan  S/p L IM nail 3/13  D/c 3/15 to NH  Enoxaparin therapeutic changed to hep gtt with PE  Now on hospice      Discharging Physician / Practitioner: Kianna Onofre MD  PCP: Esteban Jj MD  Admission Date:   Admission Orders (From admission, onward)       Ordered        03/31/24 1332  INPATIENT ADMISSION  Once                          Discharge Date: 04/02/24    Medical Problems       Resolved Problems  Date Reviewed: 4/2/2024            Resolved    Obesity, Class I, BMI 30-34.9 4/1/2024     Resolved by  Dixon Zacarias MD    Anemia 4/1/2024     Resolved by  Dixon Zacarias MD    Transaminitis 4/1/2024     Resolved by  Dixon Zacarias MD    Leukocytosis 4/1/2024      "Resolved by  Dixon Zacarias MD    Hyperglycemia 4/1/2024     Resolved by  Dixon Zacarias MD          Consultations During Hospital Stay:  Critical care,hospice    Procedures Performed:   none    Significant Findings / Test Results:   XR chest portable    Result Date: 3/31/2024  Impression: No acute cardiopulmonary disease. Workstation performed: [id number]     PE Study with CT Abdomen and Pelvis with contrast    Result Date: 3/31/2024  Impression: 1. Acute bilateral pulmonary embolism, large on the right with nearly complete occlusion of the right main pulmonary artery and small on the left with clot arborizing into upper and lower lobe branches. The calculated ratio of right ventricular to left ventricular diameter (RV/LV ratio) is 1.4. There is a critical finding of acute PE with RV/LV ratio >0.9. Based on PERT algorithm recommendations:  \"  Order STAT biomarkers including troponin, NT-BNP or BNP  \"  Calculate PESI score: o  If PESI score falls in I-III, with negative biomarkers, please order a PERT priority ECHO. o  If PESI score falls in IV-V or with positive biomarkers, please order STAT ECHO and alert the [place] PERT via PAC at [phone]. 2. Recent postoperative changes reflecting ORIF left femoral intertrochanteric fracture. 3. Additional findings as noted. I personally discussed this study with ERICA CALLES on 3/31/2024 11:48 AM. Workstation performed: [id number]      Incidental Findings:   none    Test Results Pending at Discharge (will require follow up):   none     Outpatient Tests Requested:  none    Complications:  none    Reason for Admission: Acute pulmonary embolism    Hospital Course:     Madalyn Dhaliwal is a 83 y.o. female patient who originally presented to the hospital on 3/31/2024 due to acute pulmonary embolism.  Patient had a left hip fracture with fall on 312 and had surgical fixation done on 3/13.  Patient presented back to the hospital with submassive PE with cor pulmonale " on 3/31.  Initially placed on BiPAP and then oxygen support.  2D echo on bedside showed RV dilatation.  Of care discussion held with critical care and family and patient was made comfort care/hospice care.  She is being discharged to home hospice today and currently appears to be comfortable    Please see above list of diagnoses and related plan for additional information.     Condition at Discharge: fair     Discharge Day Visit / Exam:     Subjective: Patient denies any complaints appears comfortable and pleasantly confused  Vitals: Blood Pressure: 125/88 (03/31/24 1439)  Pulse: 91 (04/02/24 0735)  Temperature: 97.9 °F (36.6 °C) (03/31/24 1439)  Temp Source: Temporal (03/31/24 1439)  Respirations: (!) 26 (04/02/24 0735)  Height: 5' (152.4 cm) (03/31/24 0945)  Weight - Scale: 81.9 kg (180 lb 8.9 oz) (03/31/24 0945)  SpO2: (!) 67 % (03/31/24 1600)  Exam:   Physical Exam  Vitals and nursing note reviewed.   Constitutional:       Appearance: She is ill-appearing.   HENT:      Head: Normocephalic and atraumatic.      Right Ear: External ear normal.      Left Ear: External ear normal.      Nose: Nose normal.      Mouth/Throat:      Pharynx: Oropharynx is clear.   Eyes:      Pupils: Pupils are equal, round, and reactive to light.   Cardiovascular:      Rate and Rhythm: Normal rate and regular rhythm.      Heart sounds: Normal heart sounds.   Pulmonary:      Effort: Pulmonary effort is normal.      Breath sounds: Rales present.   Abdominal:      General: Bowel sounds are normal.      Palpations: Abdomen is soft.      Tenderness: There is no abdominal tenderness.   Musculoskeletal:         General: Normal range of motion.      Cervical back: Normal range of motion and neck supple.   Skin:     General: Skin is warm and dry.      Capillary Refill: Capillary refill takes less than 2 seconds.   Neurological:      Mental Status: She is alert.      Comments: oriented to person   Psychiatric:         Mood and Affect: Mood normal.          Discussion with Family: discussed with spouse    Discharge instructions/Information to patient and family:   See after visit summary for information provided to patient and family.      Provisions for Follow-Up Care:  See after visit summary for information related to follow-up care and any pertinent home health orders.      Disposition:     Home with VNA Services (Reminder: Complete face to face encounter)  On home hospice  For Discharges to [facility] SNF:   Not Applicable to this Patient - Not Applicable to this Patient    Planned Readmission: none     Discharge Statement:  I spent 35 minutes discharging the patient. This time was spent on the day of discharge. I had direct contact with the patient on the day of discharge. Greater than 50% of the total time was spent examining patient, answering all patient questions, arranging and discussing plan of care with patient as well as directly providing post-discharge instructions.  Additional time then spent on discharge activities.    Discharge Medications:  See after visit summary for reconciled discharge medications provided to patient and family.      ** Please Note: This note has been constructed using a voice recognition system **

## 2024-04-02 NOTE — ASSESSMENT & PLAN NOTE
Critical care doctor and team discussed with family member regarding goals of care, after discussion family chose comfort care.  When under comfort care, follow hospice care referral, case management on board

## 2024-04-02 NOTE — NURSING NOTE
Call placed to patient's daughter Carissa and discussed AVS.  Pt daughter verbalized understanding of instructions and that [facility] will see patient at time of arrival to her home.  No questions noted at present time.  Pt to be discharged with glasses.     Pt bib from home by ems for urinary retention. He also c/o swollen legs and rash on legs and hips. Vss. Pt bed bound at baseline. Wife at bedside.

## 2024-04-02 NOTE — ASSESSMENT & PLAN NOTE
S/p L IM nail 3/13  D/c 3/15 to NH  Enoxaparin therapeutic changed to hep gtt with PE  Now on hospice

## 2024-04-03 NOTE — UTILIZATION REVIEW
NOTIFICATION OF ADMISSION DISCHARGE   This is a Notification of Discharge from [facility]. Please be advised that this patient has been discharge from our facility. Below you will find the admission and discharge date and time including the patient’s disposition.   UTILIZATION REVIEW CONTACT:  Sri Pablo  Utilization   Network Utilization Review Department  Phone: [phone] x carefully listen to the prompts. All voicemails are confidential.  Email: [email or web address]     ADMISSION INFORMATION  PRESENTATION DATE: 3/31/2024  9:44 AM  OBERVATION ADMISSION DATE:   INPATIENT ADMISSION DATE: 3/31/24  1:33 PM   DISCHARGE DATE: 4/2/2024  2:08 PM   DISPOSITION:Home with Hospice Care    Network Utilization Review Department  ATTENTION: Please call with any questions or concerns to [phone] and carefully listen to the prompts so that you are directed to the right person. All voicemails are confidential.   For Discharge needs, contact Care Management DC Support Team at [phone] opt. [phone]  Send all requests for admission clinical reviews, approved or denied determinations and any other requests to dedicated fax number below belonging to the campus where the patient is receiving treatment. List of dedicated fax numbers for the Facilities:  FACILITY NAME UR FAX NUMBER   ADMISSION DENIALS (Administrative/Medical Necessity) [phone]   DISCHARGE SUPPORT TEAM ([facility]) [phone]   PARENT CHILD HEALTH (Maternity/NICU/Pediatrics) [phone]   [facility] [phone]   [facility] [phone]   [facility] [phone]   General acute hospital [phone]   [facility] [phone]   [facility] [phone]   [facility] [phone]   [facility]  [place] [phone]   [facility] [phone]   [facility] [phone]   [facility] [phone]   [facility] [phone]

## 2024-04-05 LAB
BACTERIA BLD CULT: NORMAL
BACTERIA BLD CULT: NORMAL

## 2024-11-20 NOTE — PLAN OF CARE
Problem: Potential for Falls  Goal: Patient will remain free of falls  Description: INTERVENTIONS:  - Educate patient/family on patient safety including physical limitations  - Instruct patient to call for assistance with activity   - Consult OT/PT to assist with strengthening/mobility   - Keep Call bell within reach  - Keep bed low and locked with side rails adjusted as appropriate  - Keep care items and personal belongings within reach  - Initiate and maintain comfort rounds  - Make Fall Risk Sign visible to staff  - Initiate/Maintain bed alarm  - Obtain necessary fall risk management equipment: bed alarm  - Apply yellow socks and bracelet for high fall risk patients  - Consider moving patient to room near nurses station  Outcome: Progressing     Problem: PAIN - ADULT  Goal: Verbalizes/displays adequate comfort level or baseline comfort level  Description: Interventions:  - Encourage patient to monitor pain and request assistance  - Assess pain using appropriate pain scale  - Administer analgesics based on type and severity of pain and evaluate response  - Implement non-pharmacological measures as appropriate and evaluate response  - Consider cultural and social influences on pain and pain management  - Notify physician/advanced practitioner if interventions unsuccessful or patient reports new pain  Outcome: Progressing     Problem: INFECTION - ADULT  Goal: Absence or prevention of progression during hospitalization  Description: INTERVENTIONS:  - Assess and monitor for signs and symptoms of infection  - Monitor lab/diagnostic results  - Monitor all insertion sites, i.e. indwelling lines, tubes, and drains  - Monitor endotracheal if appropriate and nasal secretions for changes in amount and color  - Wright appropriate cooling/warming therapies per order  - Administer medications as ordered  - Instruct and encourage patient and family to use good hand hygiene technique  - Identify and instruct in appropriate  isolation precautions for identified infection/condition  Outcome: Progressing     Problem: SAFETY ADULT  Goal: Patient will remain free of falls  Description: INTERVENTIONS:  - Educate patient/family on patient safety including physical limitations  - Instruct patient to call for assistance with activity   - Consult OT/PT to assist with strengthening/mobility   - Keep Call bell within reach  - Keep bed low and locked with side rails adjusted as appropriate  - Keep care items and personal belongings within reach  - Initiate and maintain comfort rounds  - Make Fall Risk Sign visible to staff  - Apply yellow socks and bracelet for high fall risk patients  - Consider moving patient to room near nurses station  Outcome: Progressing     Problem: Knowledge Deficit  Goal: Patient/family/caregiver demonstrates understanding of disease process, treatment plan, medications, and discharge instructions  Description: Complete learning assessment and assess knowledge base.  Interventions:  - Provide teaching at level of understanding  - Provide teaching via preferred learning methods  Outcome: Progressing      [Recent Wt Gain (___ Lbs)] : recent [unfilled] ~Ulb weight gain [As Noted in HPI] : as noted in HPI [Arthralgias] : arthralgias [Fever] : no fever [Chills] : no chills [Fatigue] : no fatigue [Poor Appetite] : normal appetite  [Nasal Congestion] : no nasal congestion [Epistaxis] : no nosebleeds [Postnasal Drip] : no postnasal drip [Sinus Problems] : no sinus problems [PND] : no PND [Orthopnea] : no orthopnea [Dysrhythmia] : no dysrhythmia [Murmurs] : no murmurs were heard [Palpitations] : no palpitations [Edema] : ~T edema was not present [Hay Fever] : no hay fever [Itchy Eyes] : no itching of ~T the eyes [Nasal Discharge] : no nasal discharge [Heartburn] : no heartburn [Reflux] : no reflux [Indigestion] : no indigestion [Nocturia] : no nocturia [Frequency] : no change in urinary frequency [Dysuria] : no dysuria [Raynaud] : no Raynaud's phenomenon was observed [Scleroderma] : no scleroderma [Anemia] : no anemia [Headache] : no headache [Dizziness] : no dizziness [Syncope] : no fainting

## (undated) DEVICE — DRESSING MEPILEX AG BORDER 3 X 3 IN

## (undated) DEVICE — NEEDLE 18 G X 1 1/2 SAFETY

## (undated) DEVICE — GLOVE SRG BIOGEL 7.5

## (undated) DEVICE — HEAVY DUTY TABLE COVER: Brand: CONVERTORS

## (undated) DEVICE — DRAPE EQUIPMENT RF WAND

## (undated) DEVICE — INTENDED FOR TISSUE SEPARATION, AND OTHER PROCEDURES THAT REQUIRE A SHARP SURGICAL BLADE TO PUNCTURE OR CUT.: Brand: BARD-PARKER SAFETY BLADES SIZE 10, STERILE

## (undated) DEVICE — SINGLE PORT MANIFOLD: Brand: NEPTUNE 2

## (undated) DEVICE — GAUZE SPONGES,16 PLY: Brand: CURITY

## (undated) DEVICE — 3M™ TEGADERM™ TRANSPARENT FILM DRESSING FRAME STYLE, 1626W, 4 IN X 4-3/4 IN (10 CM X 12 CM), 50/CT 4CT/CASE: Brand: 3M™ TEGADERM™

## (undated) DEVICE — PAD GROUNDING DUAL ADULT

## (undated) DEVICE — DRESSING MEPILEX BORDER 4 X 10 IN

## (undated) DEVICE — MEDI-VAC YANK SUCT HNDL W/TPRD BULBOUS TIP: Brand: CARDINAL HEALTH

## (undated) DEVICE — COBAN 4 IN STERILE

## (undated) DEVICE — GLOVE INDICATOR PI UNDERGLOVE SZ 8 BLUE

## (undated) DEVICE — DECANTER: Brand: UNBRANDED

## (undated) DEVICE — BETHLEHEM UNIVERSAL MINOR GEN: Brand: CARDINAL HEALTH

## (undated) DEVICE — TELFA NON-ADHERENT ABSORBENT DRESSING: Brand: TELFA

## (undated) DEVICE — PAD CAST 4 IN COTTON NON STERILE

## (undated) DEVICE — GLOVE SRG BIOGEL 8

## (undated) DEVICE — NEPTUNE E-SEP SMOKE EVACUATION PENCIL, COATED, 70MM BLADE, PUSH BUTTON SWITCH: Brand: NEPTUNE E-SEP

## (undated) DEVICE — GLOVE INDICATOR UNDERGLOVE SZ 7.5 GREEN

## (undated) DEVICE — SYRINGE 20ML LL

## (undated) DEVICE — SUT VICRYL 2-0 CP-1 27 IN J266H

## (undated) DEVICE — POSITIONER HANA TABLE PACK

## (undated) DEVICE — TUBING SUCTION 5MM X 12 FT

## (undated) DEVICE — 4.2MM THREE-FLUTED DRILL BIT QC/330MM/100MM CALIBRATION

## (undated) DEVICE — ACE WRAP 6 IN STERILE

## (undated) DEVICE — 3.2MM GUIDE WIRE 400MM

## (undated) DEVICE — SUT VICRYL 0 CT-1 27 IN J260H

## (undated) DEVICE — 6617 IOBAN II PATIENT ISOLATION DRAPE 5/BX,4BX/CS: Brand: STERI-DRAPE™ IOBAN™ 2